# Patient Record
Sex: FEMALE | URBAN - METROPOLITAN AREA
[De-identification: names, ages, dates, MRNs, and addresses within clinical notes are randomized per-mention and may not be internally consistent; named-entity substitution may affect disease eponyms.]

---

## 2018-05-28 ENCOUNTER — OFFICE VISIT (OUTPATIENT)
Dept: URGENT CARE | Facility: CLINIC | Age: 58
End: 2018-05-28
Payer: COMMERCIAL

## 2018-05-28 VITALS
WEIGHT: 158 LBS | TEMPERATURE: 99 F | HEART RATE: 105 BPM | SYSTOLIC BLOOD PRESSURE: 134 MMHG | HEIGHT: 60 IN | RESPIRATION RATE: 16 BRPM | DIASTOLIC BLOOD PRESSURE: 87 MMHG | OXYGEN SATURATION: 96 % | BODY MASS INDEX: 31.02 KG/M2

## 2018-05-28 DIAGNOSIS — J06.9 VIRAL URI WITH COUGH: Primary | ICD-10-CM

## 2018-05-28 PROCEDURE — 99203 OFFICE O/P NEW LOW 30 MIN: CPT | Mod: 25,S$GLB,, | Performed by: NURSE PRACTITIONER

## 2018-05-28 PROCEDURE — 96372 THER/PROPH/DIAG INJ SC/IM: CPT | Mod: S$GLB,,, | Performed by: FAMILY MEDICINE

## 2018-05-28 RX ORDER — AMLODIPINE BESYLATE 10 MG/1
10 TABLET ORAL DAILY
COMMUNITY

## 2018-05-28 RX ORDER — AZITHROMYCIN 250 MG/1
TABLET, FILM COATED ORAL
Qty: 6 TABLET | Refills: 0 | Status: SHIPPED | OUTPATIENT
Start: 2018-05-28 | End: 2018-06-02

## 2018-05-28 RX ORDER — BETAMETHASONE SODIUM PHOSPHATE AND BETAMETHASONE ACETATE 3; 3 MG/ML; MG/ML
6 INJECTION, SUSPENSION INTRA-ARTICULAR; INTRALESIONAL; INTRAMUSCULAR; SOFT TISSUE
Status: COMPLETED | OUTPATIENT
Start: 2018-05-28 | End: 2018-05-28

## 2018-05-28 RX ORDER — BENZONATATE 100 MG/1
200 CAPSULE ORAL 3 TIMES DAILY PRN
Qty: 30 CAPSULE | Refills: 0 | Status: SHIPPED | OUTPATIENT
Start: 2018-05-28 | End: 2018-06-07

## 2018-05-28 RX ORDER — TRAZODONE HYDROCHLORIDE 100 MG/1
100 TABLET ORAL NIGHTLY
COMMUNITY

## 2018-05-28 RX ADMIN — BETAMETHASONE SODIUM PHOSPHATE AND BETAMETHASONE ACETATE 6 MG: 3; 3 INJECTION, SUSPENSION INTRA-ARTICULAR; INTRALESIONAL; INTRAMUSCULAR; SOFT TISSUE at 09:05

## 2018-05-28 NOTE — PATIENT INSTRUCTIONS
Please follow up with your primary care provider if you are not feeling better in 7-10 days.    Please drink plenty of fluids.  Please get plenty of rest.    Please return here or go to the Emergency Department for any concerns or worsening of condition.    If you do not have Hypertension or any history of palpitations, it is ok to take over the counter Sudafed or Mucinex D or Allegra-D or Claritin-D or Zyrtec-D.  If you do take one of the above, it is ok to combine that with plain over the counter Mucinex or Allegra or Claritin or Zyrtec.  If for example you are taking Zyrtec -D, you can combine that with Mucinex, but not Mucinex-D.  If you are taking Mucinex-D, you can combine that with plain Allegra or Claritin or Zyrtec.     If you do have Hypertension or palpitations, it is safe to take Coricidin HBP or Mucinex DM for relief of congestion and cough. Take as directed on bottle with at least 2 glasses of water.    If not allergic, please take over the counter Tylenol (Acetaminophen) and/or Motrin (Ibuprofen) as directed on bottle for control of pain and/or fever.    Please follow up with your primary care doctor or specialist as needed.    If you  smoke, please stop smoking.    Viral Upper Respiratory Illness (Adult)  You have a viral upper respiratory illness (URI), which is another term for the common cold. This illness is contagious during the first few days. It is spread through the air by coughing and sneezing. It may also be spread by direct contact (touching the sick person and then touching your own eyes, nose, or mouth). Frequent handwashing will decrease risk of spread. Most viral illnesses go away within 7 to 10 days with rest and simple home remedies. Sometimes the illness may last for several weeks. Antibiotics will not kill a virus, and they are generally not prescribed for this condition.    Home care  · If symptoms are severe, rest at home for the first 2 to 3 days. When you resume activity, don't  let yourself get too tired.  · Avoid being exposed to cigarette smoke (yours or others).  · You may use acetaminophen or ibuprofen to control pain and fever, unless another medicine was prescribed. (Note: If you have chronic liver or kidney disease, have ever had a stomach ulcer or gastrointestinal bleeding, or are taking blood-thinning medicines, talk with your healthcare provider before using these medicines.) Aspirin should never be given to anyone under 18 years of age who is ill with a viral infection or fever. It may cause severe liver or brain damage.  · Your appetite may be poor, so a light diet is fine. Avoid dehydration by drinking 6 to 8 glasses of fluids per day (water, soft drinks, juices, tea, or soup). Extra fluids will help loosen secretions in the nose and lungs.  · Over-the-counter cold medicines will not shorten the length of time youre sick, but they may be helpful for the following symptoms: cough, sore throat, and nasal and sinus congestion. (Note: Do not use decongestants if you have high blood pressure.)  Follow-up care  Follow up with your healthcare provider, or as advised.  When to seek medical advice  Call your healthcare provider right away if any of these occur:  · Cough with lots of colored sputum (mucus)  · Severe headache; face, neck, or ear pain  · Difficulty swallowing due to throat pain  · Fever of 100.4°F (38°C)  Call 911, or get immediate medical care  Call emergency services right away if any of these occur:  · Chest pain, shortness of breath, wheezing, or difficulty breathing  · Coughing up blood  · Inability to swallow due to throat pain  Date Last Reviewed: 9/13/2015 © 2000-2017 [employer] [address] [place] [id number]. All rights reserved. This information is not intended as a substitute for professional medical care. Always follow your healthcare professional's instructions.

## 2018-05-28 NOTE — PROGRESS NOTES
Subjective:       Patient ID: Khushbu Davis is a 57 y.o. female.    Vitals:  height is 5' (1.524 m) and weight is 71.7 kg (158 lb). Her temperature is 98.7 °F (37.1 °C). Her blood pressure is 134/87 and her pulse is 105. Her respiration is 16 and oxygen saturation is 96%.     Chief Complaint: Cough    Cough   This is a new problem. The current episode started in the past 7 days. The problem has been unchanged. The problem occurs constantly. The cough is productive of sputum. Associated symptoms include ear pain. Pertinent negatives include no chest pain, chills, eye redness, fever, headaches, myalgias, sore throat, shortness of breath or wheezing. She has tried OTC cough suppressant for the symptoms. The treatment provided mild relief. Her past medical history is significant for asthma.     Review of Systems   Constitution: Negative for chills, fever and malaise/fatigue.   HENT: Positive for congestion and ear pain. Negative for hoarse voice and sore throat.    Eyes: Negative for discharge and redness.   Cardiovascular: Negative for chest pain, dyspnea on exertion and leg swelling.   Respiratory: Positive for cough. Negative for shortness of breath, sputum production and wheezing.    Musculoskeletal: Negative for myalgias.   Gastrointestinal: Negative for abdominal pain and nausea.   Neurological: Negative for headaches.       Objective:      Physical Exam   Constitutional: She is oriented to person, place, and time. Vital signs are normal. She appears well-developed and well-nourished. She is cooperative.  Non-toxic appearance. She does not have a sickly appearance. She does not appear ill. No distress.   HENT:   Head: Normocephalic and atraumatic.   Right Ear: Hearing, tympanic membrane, external ear and ear canal normal.   Left Ear: Hearing, tympanic membrane, external ear and ear canal normal.   Nose: Mucosal edema and rhinorrhea present. Right sinus exhibits maxillary sinus tenderness. Left sinus exhibits  maxillary sinus tenderness.   Mouth/Throat: Uvula is midline and mucous membranes are normal. Posterior oropharyngeal edema and posterior oropharyngeal erythema present.   Eyes: Conjunctivae and lids are normal.   Neck: Normal range of motion and full passive range of motion without pain. Neck supple. No neck rigidity. No edema, no erythema and normal range of motion present.   Cardiovascular: Normal rate, regular rhythm and normal heart sounds.    Pulmonary/Chest: Effort normal and breath sounds normal. No accessory muscle usage. No apnea, no tachypnea and no bradypnea. No respiratory distress. She has no decreased breath sounds. She has no wheezes. She has no rhonchi. She has no rales.   Positive cough   Abdominal: Normal appearance and bowel sounds are normal.   Lymphadenopathy:        Head (right side): No submental, no submandibular and no tonsillar adenopathy present.        Head (left side): No submental, no submandibular and no tonsillar adenopathy present.     She has cervical adenopathy.        Right cervical: Superficial cervical adenopathy present.        Left cervical: Superficial cervical adenopathy present.   Neurological: She is alert and oriented to person, place, and time.   Skin: Skin is warm. Capillary refill takes less than 2 seconds.   Psychiatric: She has a normal mood and affect. Her speech is normal and behavior is normal.   Nursing note and vitals reviewed.      Assessment:       1. Viral URI with cough        Plan:         Viral URI with cough  -     betamethasone acetate-betamethasone sodium phosphate injection 6 mg; Inject 1 mL (6 mg total) into the muscle one time.  -     benzonatate (TESSALON PERLES) 100 MG capsule; Take 2 capsules (200 mg total) by mouth 3 (three) times daily as needed.  Dispense: 30 capsule; Refill: 0    Other orders  -     azithromycin (ZITHROMAX) 250 MG tablet; Take 2 tablets (500 mg) on  Day 1,  followed by 1 tablet (250 mg) once daily on Days 2 through 5.   Dispense: 6 tablet; Refill: 0      Discussed wait and see antibiotic and not to take unless needed in 5-7 days. Pt verbalizes understanding.

## 2024-03-15 ENCOUNTER — OFFICE VISIT (OUTPATIENT)
Dept: URGENT CARE | Facility: CLINIC | Age: 64
End: 2024-03-15
Payer: COMMERCIAL

## 2024-03-15 VITALS
WEIGHT: 158 LBS | RESPIRATION RATE: 18 BRPM | HEIGHT: 60 IN | BODY MASS INDEX: 31.02 KG/M2 | DIASTOLIC BLOOD PRESSURE: 99 MMHG | SYSTOLIC BLOOD PRESSURE: 159 MMHG | OXYGEN SATURATION: 95 % | TEMPERATURE: 98 F | HEART RATE: 81 BPM

## 2024-03-15 DIAGNOSIS — J32.9 SINUSITIS, UNSPECIFIED CHRONICITY, UNSPECIFIED LOCATION: Primary | ICD-10-CM

## 2024-03-15 LAB
CTP QC/QA: YES
CTP QC/QA: YES
POC MOLECULAR INFLUENZA A AGN: NEGATIVE
POC MOLECULAR INFLUENZA B AGN: NEGATIVE
SARS-COV-2 AG RESP QL IA.RAPID: NEGATIVE

## 2024-03-15 PROCEDURE — 87811 SARS-COV-2 COVID19 W/OPTIC: CPT | Mod: QW,S$GLB,, | Performed by: NURSE PRACTITIONER

## 2024-03-15 PROCEDURE — 99203 OFFICE O/P NEW LOW 30 MIN: CPT | Mod: S$GLB,,, | Performed by: NURSE PRACTITIONER

## 2024-03-15 PROCEDURE — 87502 INFLUENZA DNA AMP PROBE: CPT | Mod: QW,S$GLB,, | Performed by: NURSE PRACTITIONER

## 2024-03-15 RX ORDER — AZELASTINE 1 MG/ML
1 SPRAY, METERED NASAL 2 TIMES DAILY
Qty: 30 ML | Refills: 0 | Status: SHIPPED | OUTPATIENT
Start: 2024-03-15 | End: 2024-03-25

## 2024-03-15 RX ORDER — FLUTICASONE PROPIONATE 50 MCG
1 SPRAY, SUSPENSION (ML) NASAL 2 TIMES DAILY
Qty: 9.9 ML | Refills: 0 | Status: SHIPPED | OUTPATIENT
Start: 2024-03-15

## 2024-03-15 RX ORDER — PREDNISONE 20 MG/1
20 TABLET ORAL DAILY
Qty: 5 TABLET | Refills: 0 | Status: SHIPPED | OUTPATIENT
Start: 2024-03-15 | End: 2024-03-20

## 2024-03-15 NOTE — PROGRESS NOTES
Subjective:      Patient ID: Khushbu Davis is a 63 y.o. female.    Vitals:  height is 5' (1.524 m) and weight is 71.7 kg (158 lb). Her oral temperature is 98 °F (36.7 °C). Her blood pressure is 159/99 (abnormal) and her pulse is 81. Her respiration is 18 and oxygen saturation is 95%.     Chief Complaint: Sore Throat    63-year-old female presents to clinic for evaluation of nasal congestion, scratchy throat, headache, ear pressure x5 days.  She reports taking Benadryl and leave.  She does report already taking a daily antihistamine.  She denies any known exposures.  She expresses concerns given yesterday's symptoms of body aches and chills.  She is awake and alert, answers questions appropriately, no acute distress noted on today's visit.    Sore Throat   This is a new problem. The current episode started in the past 7 days. The problem has been unchanged. There has been no fever. The fever has been present for 5 days or more. The pain is at a severity of 0/10. The patient is experiencing no pain. Associated symptoms include congestion and headaches. She has tried NSAIDs for the symptoms. The treatment provided mild relief.       Constitution: Positive for chills. Negative for activity change, appetite change, sweating, fatigue and fever.   HENT:  Positive for congestion and sore throat.    Cardiovascular:  Negative for chest pain.   Gastrointestinal:  Positive for nausea.   Skin:  Negative for erythema.   Neurological:  Positive for headaches. Negative for dizziness.      Objective:     Physical Exam   Constitutional: She is oriented to person, place, and time. She appears well-developed.  Non-toxic appearance. She does not appear ill.   HENT:   Head: Normocephalic and atraumatic. Head is without abrasion, without contusion and without laceration.   Ears:   Right Ear: Tympanic membrane and external ear normal.   Left Ear: Tympanic membrane and external ear normal.   Nose: Mucosal edema and congestion present.  Right sinus exhibits maxillary sinus tenderness. Left sinus exhibits maxillary sinus tenderness.   Mouth/Throat: Mucous membranes are normal. Mucous membranes are moist. Posterior oropharyngeal erythema present. No oropharyngeal exudate. Oropharynx is clear.   Eyes: Conjunctivae, EOM and lids are normal. Pupils are equal, round, and reactive to light.   Neck: Trachea normal and phonation normal. Neck supple.   Cardiovascular: Normal rate, regular rhythm and normal heart sounds.   Pulmonary/Chest: Effort normal and breath sounds normal. No stridor. No respiratory distress.   Abdominal: Normal appearance.   Musculoskeletal: Normal range of motion.         General: Normal range of motion.   Neurological: She is alert and oriented to person, place, and time.   Skin: Skin is warm, dry, intact and no rash. Capillary refill takes less than 2 seconds. No abrasion, No burn, No bruising, No erythema and No ecchymosis   Psychiatric: Her speech is normal and behavior is normal. Judgment and thought content normal.   Nursing note and vitals reviewed.    Results for orders placed or performed in visit on 03/15/24   POCT Influenza A/B MOLECULAR   Result Value Ref Range    POC Molecular Influenza A Ag Negative Negative, Not Reported    POC Molecular Influenza B Ag Negative Negative, Not Reported     Acceptable Yes    SARS Coronavirus 2 Antigen, POCT Manual Read   Result Value Ref Range    SARS Coronavirus 2 Antigen Negative Negative     Acceptable Yes          Assessment:     1. Sinusitis, unspecified chronicity, unspecified location        Plan:       Sinusitis, unspecified chronicity, unspecified location  -     POCT Influenza A/B MOLECULAR  -     SARS Coronavirus 2 Antigen, POCT Manual Read  -     fluticasone propionate (FLONASE) 50 mcg/actuation nasal spray; 1 spray (50 mcg total) by Each Nostril route 2 (two) times daily.  Dispense: 9.9 mL; Refill: 0  -     azelastine (ASTELIN) 137 mcg (0.1 %)  nasal spray; 1 spray (137 mcg total) by Nasal route 2 (two) times daily. for 10 days  Dispense: 30 mL; Refill: 0  -     predniSONE (DELTASONE) 20 MG tablet; Take 1 tablet (20 mg total) by mouth once daily. for 5 days  Dispense: 5 tablet; Refill: 0      - negative COVID, negative influenza on today's visit.  Will treat for viral etiology.  Follow-up with PCP.  Patient verbalized understanding and is in agreement with the plan.    Patient Instructions   - Follow up with your PCP or specialty clinic as directed in the next 1-2 weeks if not improved or as needed.  You can call [phone] to schedule an appointment with the appropriate provider.    - Go to the ER or seek medical attention immediately if you develop new or worsening symptoms.    - You must understand that you have received an Urgent Care treatment only and that you may be released before all of your medical problems are known or treated.   - You, the patient, will arrange for follow up care as instructed.   - If your condition worsens or fails to improve we recommend that you receive another evaluation at the ER immediately or contact your PCP to discuss your concerns or return here.          - Tylenol or Ibuprofen as directed as needed for fever/pain. Avoid tylenol if you have a history of liver disease. Do not take ibuprofen if you have a history of GI bleeding, kidney disease, or if you take blood thinners.   - Take ibuprofen 600-800 mg every 6-8 hours for pain and inflammation.  You can also take Tylenol/acetaminophen 650-1000 mg every 6-8 hours for added pain relief.     - You can take over-the-counter claritin, zyrtec, allegra, or xyzal as directed. These are antihistamines that can help with runny nose, nasal congestion, sneezing, and helps to dry up post-nasal drip, which usually causes sore throat and cough.              - If you do NOT have high blood pressure, you may use a decongestant form (D)  of this medication or if you do not take the  D form, you can take sudafed (pseudoephedrine) over the counter, which is a decongestant.     - You can use Flonase (fluticasone) nasal spray as directed for sinus congestion and postnasal drip. This is a steroid nasal spray that works locally over time to decrease the inflammation in your nose/sinuses and help with allergic symptoms. This is not an quick- relief spray like afrin, but it works well if used daily.  Discontinue if you develop nose bleed  - use nasal saline prior to Flonase.     - Use Ocean Spray Nasal Saline 1-3 puffs each nostril every 2-3 hours then blow out onto tissue. This is to irrigate the nasal passage way to clear the sinus openings. Use until sinus problem resolved.     - you can take plain Mucinex (guaifenesin) 1200 mg twice a day to help loosen mucous     -warm salt water gargles can help with sore throat     - warm tea with honey can help with cough. Honey is a natural cough suppressant.     - Follow up with your PCP or specialty clinic as directed in the next 1-2 weeks if not improved or as needed.  You can call [phone] to schedule an appointment with the appropriate provider.       - Go to the ER if you develop new or worsening symptoms.

## 2024-03-15 NOTE — PATIENT INSTRUCTIONS
- Follow up with your PCP or specialty clinic as directed in the next 1-2 weeks if not improved or as needed.  You can call [phone] to schedule an appointment with the appropriate provider.    - Go to the ER or seek medical attention immediately if you develop new or worsening symptoms.    - You must understand that you have received an Urgent Care treatment only and that you may be released before all of your medical problems are known or treated.   - You, the patient, will arrange for follow up care as instructed.   - If your condition worsens or fails to improve we recommend that you receive another evaluation at the ER immediately or contact your PCP to discuss your concerns or return here.          - Tylenol or Ibuprofen as directed as needed for fever/pain. Avoid tylenol if you have a history of liver disease. Do not take ibuprofen if you have a history of GI bleeding, kidney disease, or if you take blood thinners.   - Take ibuprofen 600-800 mg every 6-8 hours for pain and inflammation.  You can also take Tylenol/acetaminophen 650-1000 mg every 6-8 hours for added pain relief.     - You can take over-the-counter claritin, zyrtec, allegra, or xyzal as directed. These are antihistamines that can help with runny nose, nasal congestion, sneezing, and helps to dry up post-nasal drip, which usually causes sore throat and cough.              - If you do NOT have high blood pressure, you may use a decongestant form (D)  of this medication or if you do not take the D form, you can take sudafed (pseudoephedrine) over the counter, which is a decongestant.     - You can use Flonase (fluticasone) nasal spray as directed for sinus congestion and postnasal drip. This is a steroid nasal spray that works locally over time to decrease the inflammation in your nose/sinuses and help with allergic symptoms. This is not an quick- relief spray like afrin, but it works well if used daily.  Discontinue if you develop nose  bleed  - use nasal saline prior to Flonase.     - Use Ocean Spray Nasal Saline 1-3 puffs each nostril every 2-3 hours then blow out onto tissue. This is to irrigate the nasal passage way to clear the sinus openings. Use until sinus problem resolved.     - you can take plain Mucinex (guaifenesin) 1200 mg twice a day to help loosen mucous     -warm salt water gargles can help with sore throat     - warm tea with honey can help with cough. Honey is a natural cough suppressant.     - Follow up with your PCP or specialty clinic as directed in the next 1-2 weeks if not improved or as needed.  You can call [phone] to schedule an appointment with the appropriate provider.       - Go to the ER if you develop new or worsening symptoms.

## 2024-08-08 ENCOUNTER — TELEPHONE (OUTPATIENT)
Dept: VASCULAR SURGERY | Facility: CLINIC | Age: 64
End: 2024-08-08
Payer: COMMERCIAL

## 2024-08-08 DIAGNOSIS — M79.89 LEG SWELLING: Primary | ICD-10-CM

## 2024-08-28 ENCOUNTER — HOSPITAL ENCOUNTER (OUTPATIENT)
Dept: RADIOLOGY | Facility: HOSPITAL | Age: 64
Discharge: HOME | End: 2024-08-28
Attending: SURGERY
Payer: COMMERCIAL

## 2024-08-28 DIAGNOSIS — M79.89 LEG SWELLING: ICD-10-CM

## 2024-08-28 PROCEDURE — 93970 EXTREMITY STUDY: CPT | Mod: 26,,, | Performed by: STUDENT IN AN ORGANIZED HEALTH CARE EDUCATION/TRAINING PROGRAM

## 2024-08-28 PROCEDURE — 93970 EXTREMITY STUDY: CPT | Mod: TC

## 2024-08-29 ENCOUNTER — INITIAL CONSULT (OUTPATIENT)
Dept: VASCULAR SURGERY | Facility: CLINIC | Age: 64
End: 2024-08-29
Payer: COMMERCIAL

## 2024-08-29 ENCOUNTER — TELEPHONE (OUTPATIENT)
Dept: VASCULAR SURGERY | Facility: CLINIC | Age: 64
End: 2024-08-29

## 2024-08-29 VITALS
HEIGHT: 61 IN | BODY MASS INDEX: 33.61 KG/M2 | WEIGHT: 178 LBS | HEART RATE: 94 BPM | DIASTOLIC BLOOD PRESSURE: 114 MMHG | SYSTOLIC BLOOD PRESSURE: 182 MMHG

## 2024-08-29 DIAGNOSIS — I89.0 LYMPHEDEMA: Primary | ICD-10-CM

## 2024-08-29 DIAGNOSIS — M79.89 LEG SWELLING: ICD-10-CM

## 2024-08-29 DIAGNOSIS — R60.9 LIPEDEMA: ICD-10-CM

## 2024-08-29 PROCEDURE — 99999 PR PBB SHADOW E&M-EST. PATIENT-LVL III: CPT | Mod: PBBFAC,,, | Performed by: SURGERY

## 2024-08-29 PROCEDURE — 99204 OFFICE O/P NEW MOD 45 MIN: CPT | Mod: S$GLB,,, | Performed by: SURGERY

## 2024-08-29 NOTE — PROGRESS NOTES
Hoang Roper MD, RPVI                                 [facility]                           [facility]                             2024    HPI:  Khushbu Davis is a 64 y.o. female with There is no problem list on file for this patient.   being managed by PCP and specialists who is here today for evaluation of BLE edema.  Patient states location is BLE occurring for yrs.  Associated signs and symptoms include pain.  Quality is heavy and severity is 6/10.  Symptoms began yrs ago.  Alleviating factors include elevation.  Worsening factors include dependency.  Patient has not been wearing compression stockings for greater than 3 months.  Symptoms do limit patient's functional status and daily activities.  no DVT history.  no venous interventions.  no low sodium diet.  no excessive water intake.    Migraine with aura: no  PFO/ASD/right to left shunt: no  Pregnant: no  Breastfeeding: no    no MI  no Stroke  Tobacco use: denies    Past Medical History:   Diagnosis Date    Anxiety     Depression     Hypertension      Past Surgical History:   Procedure Laterality Date    BELT ABDOMINOPLASTY       SECTION      CHOLECYSTECTOMY       No family history on file.  Social History     Socioeconomic History    Marital status:    Tobacco Use    Smoking status: Never     Social Determinants of Health     Financial Resource Strain: Low Risk  (2024)    Overall Financial Resource Strain (CARDIA)     Difficulty of Paying Living Expenses: Not hard at all   Food Insecurity: No Food Insecurity (2024)    Hunger Vital Sign     Worried About Running Out of Food in the Last Year: Never true     Ran Out of Food in the Last Year: Never true   Transportation Needs: No Transportation Needs (2024)    [facility]     Lack of Transportation (Medical): No     Lack of Transportation (Non-Medical): No   Physical Activity: Sufficiently Active  (8/22/2024)    Exercise Vital Sign     Days of Exercise per Week: 5 days     Minutes of Exercise per Session: 30 min   Stress: No Stress Concern Present (8/22/2024)    [place] citizen [place] [employer] - Occupational Stress Questionnaire     Feeling of Stress : Only a little   Housing Stability: Unknown (8/22/2024)    Housing Stability Vital Sign     Unable to Pay for Housing in the Last Year: No       Current Outpatient Medications:     fluticasone propionate (FLONASE) 50 mcg/actuation nasal spray, 1 spray (50 mcg total) by Each Nostril route 2 (two) times daily., Disp: 9.9 mL, Rfl: 0    traZODone (DESYREL) 100 MG tablet, Take 100 mg by mouth every evening., Disp: , Rfl:     UNKNOWN TO PATIENT, , Disp: , Rfl:     amLODIPine (NORVASC) 10 MG tablet, Take 10 mg by mouth once daily., Disp: , Rfl:     azelastine (ASTELIN) 137 mcg (0.1 %) nasal spray, 1 spray (137 mcg total) by Nasal route 2 (two) times daily. for 10 days, Disp: 30 mL, Rfl: 0    REVIEW OF SYSTEMS:  General: No fevers or chills; ENT: No sore throat; Allergy and Immunology: no persistent infections; Hematological and Lymphatic: No history of bleeding or easy bruising; Endocrine: negative; Respiratory: no cough, shortness of breath, or wheezing; Cardiovascular: no chest pain or dyspnea on exertion; Gastrointestinal: no abdominal pain/back, change in bowel habits, or bloody stools; Genito-Urinary: no dysuria, trouble voiding, or hematuria; Musculoskeletal: edema; Neurological: no TIA or stroke symptoms; Psychiatric: no nervousness, anxiety or depression.    PHYSICAL EXAM:      Pulse: 94         General appearance:  Alert, well-appearing, and in no distress.  Oriented to person, place, and time                    Neurological: Normal speech, no focal findings noted; CN II - XII grossly intact. RLE with sensation to light touch, LLE with sensation to light touch.            Musculoskeletal: Digits/nail without cyanosis/clubbing.  Strength 5/5 BLE.        "             Neck: Supple, no significant adenopathy                  Chest:  No wheezes, symmetric air entry. No use of accessory muscles               Cardiac: Normal rate and regular rhythm            Abdomen: Soft, nontender, nondistended      Extremities:   2+ R DP pulse, 2+ L DP pulse      1+ RLE edema, 1+ LLE edema    Skin:  RLE no ulcer; LLE no ulcer      RLE no spider veins, LLE no spider veins      RLE no varicose veins, LLE no varicose veins    CEAP 3/3    VCSS 6    LAB RESULTS:  No results found for: "CBC"  No results found for: "LABPROT", "INR"  Lab Results   Component Value Date     (L) 03/15/2022    K 3.8 03/15/2022    CO2 24 03/15/2022    BUN 18.7 (H) 03/15/2022    CREATININE 0.71 03/15/2022    CALCIUM 9.6 03/15/2022    ANIONGAP 4 (L) 03/15/2022     No results found for: "WBC", "RBC", "HGB", "HCT", "MCV", "MCH", "MCHC", "RDW", "PLT", "MPV", "GRAN", "LYMPH", "MONO", "EOS", "BASO", "EOSINOPHIL", "BASOPHIL", "DIFFMETHOD"  .No results found for: "HGBA1C"    IMAGING:  All pertinent imaging has been reviewed and interpreted independently.    Venous US 8/2024 Impression:  Color flow evaluation of the right lower extremity demonstrates no evidence of venous thrombosis in the deep or superficial veins, and no reflux.  Color flow evaluation of the left lower extremity demonstrates no evidence of venous thrombosis in the deep or superficial veins, and no reflux.    IMP/PLAN:  64 y.o. female with There is no problem list on file for this patient.   being managed by PCP and specialists who is here today for evaluation of BLE edema and lymphedema.    -recommend compression with Rx stockings, elevation, dietary changes associated with water and sodium intake discussed at length with patient  -Exercise   -Patient is diagnosed with lymphedema and presents with hyperpigmentation of the lower extremity.  Patient has previously attempted compression, elevation, and exercise for at least 4 weeks.  Patient has " secondary lymphedema and has tried and failed compression of 20-30 mm Hg, elevation and exercise for >1 month period however symptoms persist.  Basic pump trial performed and discontinued due to sensitivity and pain to static pressure.  Symptoms of swelling, pain and hyperplasia present.  A compression device is recommended to treat current symptoms and prevent disease progression. The patient has tried elevation, exercise x 4 weeks and compression and symptoms remain.  The patient has marked hyperkeratosis with hyperplasia and hyperpigmentation. Pt has been compliant with diet/med eval, MLD, skin care  - recommend lymphedema clinic therapy and pumps  -RTC 3 months for further evaluation    I spent 12 minutes evaluating this patient and greater than 50% of the time was spent counseling, coordinator care and discussing the plan of care.  All questions were answered and patient stated understanding with agreement with the above treatment plan.    Hoang Roper MD [facility]  Vascular and Endovascular Surgery

## 2024-09-04 DIAGNOSIS — I89.0 LYMPHEDEMA: ICD-10-CM

## 2024-09-04 DIAGNOSIS — M79.89 LEG SWELLING: Primary | ICD-10-CM

## 2024-09-17 DIAGNOSIS — I89.0 LYMPHEDEMA: Primary | ICD-10-CM

## 2024-09-17 DIAGNOSIS — M79.89 LEG SWELLING: ICD-10-CM

## 2024-09-27 ENCOUNTER — TELEPHONE (OUTPATIENT)
Dept: VASCULAR SURGERY | Facility: CLINIC | Age: 64
End: 2024-09-27
Payer: COMMERCIAL

## 2024-09-27 NOTE — TELEPHONE ENCOUNTER
Pneumatic compression pump prescription faxed to [facility]. Confirmation of fax received via email.

## 2024-09-30 ENCOUNTER — CLINICAL SUPPORT (OUTPATIENT)
Dept: REHABILITATION | Facility: HOSPITAL | Age: 64
End: 2024-09-30
Payer: COMMERCIAL

## 2024-09-30 DIAGNOSIS — M79.89 LEG SWELLING: ICD-10-CM

## 2024-09-30 DIAGNOSIS — I89.0 LYMPHEDEMA OF BOTH LOWER EXTREMITIES: Primary | ICD-10-CM

## 2024-09-30 DIAGNOSIS — M79.89 SWELLING OF LOWER EXTREMITY: ICD-10-CM

## 2024-09-30 DIAGNOSIS — I89.0 LYMPHEDEMA: ICD-10-CM

## 2024-09-30 PROCEDURE — 97162 PT EVAL MOD COMPLEX 30 MIN: CPT

## 2024-09-30 PROCEDURE — 97140 MANUAL THERAPY 1/> REGIONS: CPT

## 2024-09-30 PROCEDURE — 97535 SELF CARE MNGMENT TRAINING: CPT

## 2024-10-02 NOTE — PATIENT INSTRUCTIONS
Attendance policy:     Consistent attendance is critical to the success of the rehabilitation plan that you and your therapist have established.     If you have any concerns with your schedule, please contact the  at least 24 hours prior to your scheduled appointment. If you are more than 15 minutes late to your appointment, you may be asked to reschedule.     If you no show 2 appointments or miss 3 visits consecutively, you will be removed from the schedule or discharged from therapy. Appointments will then be scheduled on a [id number] basis.       [facility] 2nd Floor   Direct Phone: [phone]

## 2024-10-11 ENCOUNTER — CLINICAL SUPPORT (OUTPATIENT)
Dept: REHABILITATION | Facility: HOSPITAL | Age: 64
End: 2024-10-11
Payer: COMMERCIAL

## 2024-10-11 DIAGNOSIS — I89.0 LYMPHEDEMA OF BOTH LOWER EXTREMITIES: Primary | ICD-10-CM

## 2024-10-11 DIAGNOSIS — M79.89 SWELLING OF LOWER EXTREMITY: ICD-10-CM

## 2024-10-11 PROCEDURE — 97016 VASOPNEUMATIC DEVICE THERAPY: CPT

## 2024-10-11 PROCEDURE — 97140 MANUAL THERAPY 1/> REGIONS: CPT

## 2024-10-11 PROCEDURE — 97535 SELF CARE MNGMENT TRAINING: CPT

## 2024-10-11 NOTE — PROGRESS NOTES
Physical Therapy Daily Treatment Note     Name: Khushbu Davis  Clinic Number: [id number]    Therapy Diagnosis:   Encounter Diagnoses   Name Primary?    Lymphedema of both lower extremities Yes    Swelling of lower extremity      Physician: Hoang Roper MD    Visit Date: 10/11/2024    Physician Orders: PT Eval and Treat - lymphedema  Medical Diagnosis from Referral:   Diagnosis   I89.0 (ICD-10-CM) - Lymphedema, not elsewhere classified   M79.89 (ICD-10-CM) - Other specified soft tissue disorders      Free Text Diagnosis   I89.0 (ICD-10-CM) - Lymphedema M79.89 (ICD-10-CM) - Leg swelling   Evaluation Date: 9/30/2024  Authorization Period Expiration: 12/31/24  Plan of Care Expiration: 12/24/24  Visit # / Visits authorized: 2/20     Time In: 1250p  Time Out: 150p  Total Billable Time: 60 minutes  pump     Precautions: Standard    Subjective     Pt reports: she was told by Dr. Roper of home pump consult - no contact yet.   She was compliant with home compression/exercise program.  Response to previous treatment: pt is wearing her Sigvaris TH garments daily, she will consider other options for compression choices with therapy guidance.   Functional change: amb no device, pt will be in [place] next week - rescheduled visit.   Pt has other appointments today - wishes to have 1 leg bandaged - questions fit of shoe.     Pain: 2/10  Location: bilateral BLE mostly tender to touch, admits more pain in L UE - may seek orders for arm sleeve as mentioned with her doctor      Objective   Female amb to dept no device, wearing Sigvaris TH 20-30mmHg B LE, sandals  Amount of Swelling/Location of Swelling: mild/mod soft fullness to B LE ankles, lower legs, triangular soft fullness thighs   Skin Integrity: fatty distributions prox leg, ankles, thighs   Palpation/Texture: soft fullness, density, no pitting, tender to deep touch, broken veins   - B Stemmer Sign  - B Neo's Sign  Circulation: intact       Treatment:   Khushbu received  the following manual therapy techniques:- Manual Lymphatic Drainage were applied to the: B LE for 45 minutes, including: MLD and short stretch compression bandaging   Reapplied TH garment to one  LE    MANUAL LYMPHATIC DRAINAGE (MLD):    While supine with LEs elevated stimulation at terminus, along GI region, B inguinal regions, drainage of entire L LE fred lower leg, ankle, and foot with return proximally,  Use of Aquaphor/Eucerin due to dryness.   Consider self massage to abdominal areas, B inguinal areas, thigh, and remaining LE within reach.    SEQUENTIAL COMPRESSION PUMP: full leg sleeve applied to one LE  while  simultaneous to Manual Lymphatic Drainage, MLD,  to  other  LE  VES 5200 with default setting with distal pressures starting at 45mmHg entire LE   Simultaneous to compression supplies, compression education and training, and self management.    MULTILAYERED BANDAGING: issued supplies and bandaged L LE with cotton stockinette, cotton padding, 2-8cm Rosidal K rolls foot to knee, Edema Wear Large to thigh to leave intact 12-24 hrs as tolerated, discontinue with any problems, return rolled bandages next session. Wash and wear schedules confirmed.   Reapplied TH to R LE.  Slight push on bandaging with her sandal choice - advised to monitor and readjust foot once home as allowed.     Khushbu received therapeutic exercises to develop strength, endurance, ROM, flexibility, and posture  including: aps, walking, avoid dependency and immobility, support of muscle pump with compression and activity.  THERAPEUTIC EXERCISES:  Continue HEP of AROM, stretching, and postural correction.     Home Exercises Provided and Patient Education Provided:  Self Care Home Management Training/Functional Therapeutic Activity x 15 minutes  Discussed options with Lipo Lymphedema vs Lymphedema vs Lipedema- including product selection, treatment components, and consults pending.  Reviewed travel demands and recommendations for  "compression, hydration and mobility.   Compression, mobility and goals of management regarding lipo lymphedema and self massage for B LE as well as upper body and L UE.   Compression choices in style, class and product - past visit given suggestions for Bioflect style support.   Present compression:  Sigvaris TH 20-30mmHg x 3  Orders and recommendations: daily compression,  TH, KH + tights, leggings, Dr. Roper sent pump consult to [facility],  Therapy forwarded supporting documentation and measurements 10/2/24.  PATIENT/FAMILY Education: bandaging/compression wear schedule,  HEP,  Beginning of self massage,  Self or assisted bandaging, compression options, and Risk reduction    Written Home Exercises Provided: Patient instructed to cont prior HEP.  Home exercise and compression plan of management was reviewed and Khushbu was able to demonstrate understanding prior to the end of the session.  Khushbu demonstrated good  understanding of the education provided.     Assessment   Khushbu DONATO is a 64 y.o. female referred to outpatient Physical Therapy with a medical diagnosis of   Diagnosis   I89.0 (ICD-10-CM) - Lymphedema, not elsewhere classified   M79.89 (ICD-10-CM) - Other specified soft tissue disorders      Free Text Diagnosis   I89.0 (ICD-10-CM) - Lymphedema M79.89 (ICD-10-CM) - Leg swelling   This patient presents s/p LE pain and edema with lipedema characteristics resulting in: mild lymphedema of the B LE, increased pain, increased stiffness in the soft tissues and tenderness, size/shape changes, bruising, broken veins, as well as difficulty performing weight loss/management , compression needs, and placing the pt at higher risk of infection. Will initiate Complete Decongestive Therapy, CDT, to assist in symptom relief and management.     Pt with complaints of "lumpy fatty tissue and tenderness to touch" in both legs and left arm.  Pt is using LE compression TH daily.  Therapy initiated Complete Decongestive Therapy, " CDT, to  BLE to aid in symptomatic complaints.  Reviewed lipo  lymphedema vs lipedema characteristics and management plans to support.  Attempting bandaging and will consider new or alternate compression choices if beneficial. MD referral for therapy, home pump consult and already has 3 pairs TH.    Khushbu Is progressing well towards her goals.   Pt prognosis is Good.     Pt will continue to benefit from skilled outpatient physical therapy to address the deficits listed in the problem list box on initial evaluation, provide pt/family education and to maximize pt's level of independence in the home and community environment.     Pt's spiritual, cultural and educational needs considered and pt agreeable to plan of care and goals.    Anticipated Barriers for therapy: cost of copay, shoulder/neck PT needs     The following goals were discussed with the patient and patient is in agreement with them as to be addressed in the treatment plan.      Short Term Goals: (6 weeks)  1. Patient will show decreased girth in B LE by up to 1 cm to allow for LE symmetry, shoe and clothing choice, and ability to apply needed compression.  (progressing, not met)   2. Patient will demonstrate 100% knowledge of lymphedema precautions and signs of infection to allow for reduced lymphedema risk, infection risk, and/or exacerbation of condition.  (progressing, not met)  3. Patient or caregiver will perform self-bandaging techniques and/or wearing of compression garments to allow for lymphatic drainage support, skin elasticity, and reduction in shape and size of limb. (progressing, not met)  4. Patient will perform self lymph drainage techniques to areas within reach to enhance lymphatic drainage and skin condition.  (progressing, not met)  5. Patient will tolerate daily activities with multilayered bandaging to allow for lymphatic and venous support.  (progressing, not met)     Long Term Goals: (12  weeks)  1. Patient will show decreased girth  in B LE by up to 1 cm  to allow for LE symmetry, shoe and clothing choice, and ability to apply needed compression daily.  (progressing, not met)  2. Patient will show reduction in density to mild or less with improved contour of limb to allow for cosmesis, LE symmetry, infection risk reduction, and clothing and compression choice.   (progressing, not met)  3. Patient to ashley/doff compression garment with daily compliance to assist in lymphedema management, skin elasticity, and tissue density.  (progressing, not met)  4. Pt to show improved postural awareness and alignment.  (progressing, not met)  5. Pt to be I and compliant with HEP to allow for increased function in affected limb.   (progressing, not met)  Plan   Plan of care Certification: 9/30/2024 to 12/24/24.     Outpatient Physical Therapy 1 times weekly for 10 weeks to include the following interventions: Patient Education, Self Care, Therapeutic Activities, and Therapeutic Exercise. Complete Decongestive Therapy- compression and home equipment needs to be addressed and assisted.    Essence Cobos, PT

## 2024-10-25 ENCOUNTER — CLINICAL SUPPORT (OUTPATIENT)
Dept: REHABILITATION | Facility: HOSPITAL | Age: 64
End: 2024-10-25
Payer: COMMERCIAL

## 2024-10-25 DIAGNOSIS — M79.89 SWELLING OF LOWER EXTREMITY: ICD-10-CM

## 2024-10-25 DIAGNOSIS — I89.0 LYMPHEDEMA OF BOTH LOWER EXTREMITIES: Primary | ICD-10-CM

## 2024-10-25 PROCEDURE — 97140 MANUAL THERAPY 1/> REGIONS: CPT

## 2024-10-25 PROCEDURE — 97016 VASOPNEUMATIC DEVICE THERAPY: CPT

## 2024-10-25 NOTE — PROGRESS NOTES
"  Physical Therapy Daily Treatment Note     Name: Khushbu Davis  Clinic Number: [id number]    Therapy Diagnosis:   Encounter Diagnoses   Name Primary?    Lymphedema of both lower extremities Yes    Swelling of lower extremity      Physician: Hoang Roper MD    Visit Date: 10/25/2024    Physician Orders: PT Eval and Treat - lymphedema  Medical Diagnosis from Referral:   Diagnosis   I89.0 (ICD-10-CM) - Lymphedema, not elsewhere classified   M79.89 (ICD-10-CM) - Other specified soft tissue disorders      Free Text Diagnosis   I89.0 (ICD-10-CM) - Lymphedema M79.89 (ICD-10-CM) - Leg swelling   Evaluation Date: 9/30/2024  Authorization Period Expiration: 12/31/24  Plan of Care Expiration: 12/24/24  Visit # / Visits authorized: 3/20     Time In: 100p  Time Out: 200p  Total Billable Time: 60 minutes  pump     Precautions: Standard    Subjective     Pt reports: pt visited her son in [place] [facility], while there she saw a Lipedema specialist who treats with liposuction - 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  Pt will continue with present treatments and seek referral for arms, she may consider return to [place] [facility] specialist next year.   She was compliant with home compression/exercise program.  Response to previous treatment: Contact from pump vendor,  [employer] - states $800 per deductible, may work with for lessor output - awaiting further information since return from travel.  Pt continues to have most pain complaints in L arm.  Legs and arms may be tender especially to touch.   She has Sigvaris TH garments daily, hasn't had time to consider other options for compression choices.   Functional change: after much walking in [place] [facility] had soreness, she went for a muscle massage - tender but "hurts to relief"   Pt will need to question Dr. Roper for arm treatments - will complete LE for several visits as trial.   Wearing TH daily.   Pain: 2/10  Location: bilateral BLE mostly " tender to touch, admits more pain in L UE - may seek orders for arm sleeve as mentioned with her doctor      Objective   Female amb to dept no device, wearing Sigvaris TH 20-30mmHg B LE, sandals  Amount of Swelling/Location of Swelling: mild/mod soft fullness to B LE ankles, lower legs, triangular soft fullness thighs   Skin Integrity: fatty distributions prox leg, ankles, thighs   Palpation/Texture: soft fullness, density, no pitting, tender to deep touch, broken veins   - B Stemmer Sign  - B Neo's Sign  Circulation: intact       Treatment:   Khushbu received the following manual therapy techniques:- Manual Lymphatic Drainage were applied to the: B LE for 55 minutes, including: MLD and short stretch compression bandaging   Reapplied TH garment to one  LE    MANUAL LYMPHATIC DRAINAGE (MLD):    While supine with LEs elevated stimulation at terminus, along GI region, B inguinal regions, drainage of entire R LE fred lower leg, ankle, and foot with return proximally,  Use of Aquaphor/Eucerin due to dryness.   Consider self massage to abdominal areas, B inguinal areas, thigh, and remaining LE within reach.    SEQUENTIAL COMPRESSION PUMP: full leg sleeve applied to one LE  while  simultaneous to Manual Lymphatic Drainage, MLD,  to  other  LE  VES 5200 with default setting with distal pressures starting at 45mmHg entire LE   Simultaneous to compression supplies, compression education and training, and self management.    MULTILAYERED BANDAGING: issued supplies and bandaged R and L  LE with cotton stockinette, cotton padding, 1 8cm and 1-10cm Rosidal K rolls foot to knee, Edema Wear Large to thigh to leave intact 12-24 hrs as tolerated, discontinue with any problems, return rolled bandages next session. Wash and wear schedules confirmed.   Very slight push on bandaging with her sandal choice - advised to monitor and readjust foot once home as allowed.     Khushbu received therapeutic exercises to develop strength, endurance,  ROM, flexibility, and posture  including: aps, walking, avoid dependency and immobility, support of muscle pump with compression and activity.  THERAPEUTIC EXERCISES:  Continue HEP of AROM, stretching, and postural correction.     Home Exercises Provided and Patient Education Provided:  Self Care Home Management Training/Functional Therapeutic Activity x 5 minutes  UE vs LE  Present orders per Dr. Roper for leg swelling - will continue 4-5 visits and request consideration for UE as warranted  discussed options with Lipo Lymphedema vs Lymphedema vs Lipedema- including product selection, treatment components, and consults pending.  Compression, mobility and goals of management regarding lipo lymphedema and self massage for B LE as well as upper body and L UE.   Compression choices in style, class and product - past visit given suggestions for Bioflect style support.   Pump consult status:  States deductible with $400-$800 her responsibility - reviewed pump use and goals of treatment - B LE leg sleeves and pump unit per her insurance and vendor consulted by Dr. Roper - often home trial and training per vendor.   Orders B  LE  leg swelling, Lymphedema, will need orders/referral to treat UE.  Present compression:  Sigvaris TH 20-30mmHg x 3  Options for Bioflect or Solidea - leggings tights  Orders and recommendations: daily compression,  TH, KH + tights, leggings, Dr. Roper sent pump consult to [employer],  Therapy forwarded supporting documentation 10/2/24.  PATIENT/FAMILY Education: bandaging/compression wear schedule,  HEP,  Beginning of self massage,  Self or assisted bandaging, compression options, and Risk reduction    Written Home Exercises Provided: Patient instructed to cont prior HEP.  Home exercise and compression plan of management was reviewed and Khushbu was able to demonstrate understanding prior to the end of the session.  Khushbu demonstrated good  understanding of the education provided.      Assessment   Khushbu DONATO is a 64 y.o. female referred to outpatient Physical Therapy with a medical diagnosis of   Diagnosis   I89.0 (ICD-10-CM) - Lymphedema, not elsewhere classified   M79.89 (ICD-10-CM) - Other specified soft tissue disorders      Free Text Diagnosis   I89.0 (ICD-10-CM) - Lymphedema M79.89 (ICD-10-CM) - Leg swelling   This patient presents s/p LE pain and edema with lipedema characteristics resulting in: mild lymphedema of the B LE, increased pain, increased stiffness in the soft tissues and tenderness, size/shape changes, bruising, broken veins, as well as difficulty performing weight loss/management , compression needs, and placing the pt at higher risk of infection. Will initiate Complete Decongestive Therapy, CDT, to assist in symptom relief and management.     Pt admits outside consult noting components of lipedema B UE and B LE with lymphedema present in lower  legs.  Support with compression, pump assist and exercise/activity can assist these conditions.  Pt has TH garments and will continue daily support.  Remains in process with home pump consult per Dr. Roper for her B LE.  B UE will need added referral and focus may shift once B LE have secured management plans.   Lipo  lymphedema vs lipedema characteristics and management plans.    Khushbu Is progressing well towards her goals.   Pt prognosis is Good.     Pt will continue to benefit from skilled outpatient physical therapy to address the deficits listed in the problem list box on initial evaluation, provide pt/family education and to maximize pt's level of independence in the home and community environment.     Pt's spiritual, cultural and educational needs considered and pt agreeable to plan of care and goals.    Anticipated Barriers for therapy: cost of copay, shoulder/neck PT needs     The following goals were discussed with the patient and patient is in agreement with them as to be addressed in the treatment plan.      Short Term  Goals: (6 weeks)  1. Patient will show decreased girth in B LE by up to 1 cm to allow for LE symmetry, shoe and clothing choice, and ability to apply needed compression.  (progressing, not met)   2. Patient will demonstrate 100% knowledge of lymphedema precautions and signs of infection to allow for reduced lymphedema risk, infection risk, and/or exacerbation of condition.  (progressing, not met)  3. Patient or caregiver will perform self-bandaging techniques and/or wearing of compression garments to allow for lymphatic drainage support, skin elasticity, and reduction in shape and size of limb. (progressing, not met)  4. Patient will perform self lymph drainage techniques to areas within reach to enhance lymphatic drainage and skin condition.  (progressing, not met)  5. Patient will tolerate daily activities with multilayered bandaging to allow for lymphatic and venous support.  (progressing, not met)     Long Term Goals: (12  weeks)  1. Patient will show decreased girth in B LE by up to 1 cm  to allow for LE symmetry, shoe and clothing choice, and ability to apply needed compression daily.  (progressing, not met)  2. Patient will show reduction in density to mild or less with improved contour of limb to allow for cosmesis, LE symmetry, infection risk reduction, and clothing and compression choice.   (progressing, not met)  3. Patient to ashley/doff compression garment with daily compliance to assist in lymphedema management, skin elasticity, and tissue density.  (progressing, not met)  4. Pt to show improved postural awareness and alignment.  (progressing, not met)  5. Pt to be I and compliant with HEP to allow for increased function in affected limb.   (progressing, not met)  Plan   Plan of care Certification: 9/30/2024 to 12/24/24.     Outpatient Physical Therapy 1 times weekly for 10 weeks to include the following interventions: Patient Education, Self Care, Therapeutic Activities, and Therapeutic Exercise.  Complete Decongestive Therapy- compression and home equipment needs to be addressed and assisted.    Essence Cobos, PT

## 2024-11-01 ENCOUNTER — CLINICAL SUPPORT (OUTPATIENT)
Dept: REHABILITATION | Facility: HOSPITAL | Age: 64
End: 2024-11-01
Payer: COMMERCIAL

## 2024-11-01 DIAGNOSIS — M79.89 SWELLING OF LOWER EXTREMITY: ICD-10-CM

## 2024-11-01 DIAGNOSIS — I89.0 LYMPHEDEMA OF BOTH LOWER EXTREMITIES: Primary | ICD-10-CM

## 2024-11-01 PROCEDURE — 97016 VASOPNEUMATIC DEVICE THERAPY: CPT

## 2024-11-01 PROCEDURE — 97140 MANUAL THERAPY 1/> REGIONS: CPT

## 2024-11-01 PROCEDURE — 97535 SELF CARE MNGMENT TRAINING: CPT

## 2024-11-05 DIAGNOSIS — I89.0 LYMPHEDEMA: ICD-10-CM

## 2024-11-05 DIAGNOSIS — R60.9 LIPEDEMA: Primary | ICD-10-CM

## 2024-11-06 ENCOUNTER — CLINICAL SUPPORT (OUTPATIENT)
Dept: REHABILITATION | Facility: HOSPITAL | Age: 64
End: 2024-11-06
Payer: COMMERCIAL

## 2024-11-06 ENCOUNTER — TELEPHONE (OUTPATIENT)
Dept: VASCULAR SURGERY | Facility: CLINIC | Age: 64
End: 2024-11-06
Payer: COMMERCIAL

## 2024-11-06 DIAGNOSIS — M79.89 SWELLING OF LOWER EXTREMITY: ICD-10-CM

## 2024-11-06 DIAGNOSIS — I89.0 LYMPHEDEMA: ICD-10-CM

## 2024-11-06 DIAGNOSIS — R60.9 LIPEDEMA: ICD-10-CM

## 2024-11-06 DIAGNOSIS — I89.0 LYMPHEDEMA OF BOTH LOWER EXTREMITIES: Primary | ICD-10-CM

## 2024-11-06 PROCEDURE — 97140 MANUAL THERAPY 1/> REGIONS: CPT

## 2024-11-06 PROCEDURE — 97016 VASOPNEUMATIC DEVICE THERAPY: CPT

## 2024-11-06 NOTE — TELEPHONE ENCOUNTER
Called pt. regarding order place for her to have physical therapy  to BUE as requested by therapist. No answer and unable to leave message.

## 2024-11-06 NOTE — PROGRESS NOTES
Physical Therapy Daily Treatment Note/Reassessment     Name: Khushbu Davis  Clinic Number: [id number]    Therapy Diagnosis:   Encounter Diagnoses   Name Primary?    Lipedema     Lymphedema     Lymphedema of both lower extremities Yes    Swelling of lower extremity      Physician: Hoang Roper MD    Visit Date: 11/6/2024    Physician Orders: PT Eval and Treat - lymphedema  Medical Diagnosis from Referral:   Updated referral 11/5/24 per Dr. Roper for B UE / B LE     Diagnosis Information    Diagnosis   R60.9 (ICD-10-CM) - Lipedema   I89.0 (ICD-10-CM) - Lymphedema     Diagnosis   I89.0 (ICD-10-CM) - Lymphedema, not elsewhere classified   M79.89 (ICD-10-CM) - Other specified soft tissue disorders      Free Text Diagnosis   I89.0 (ICD-10-CM) - Lymphedema M79.89 (ICD-10-CM) - Leg swelling   Evaluation Date: 9/30/2024  Authorization Period Expiration: 12/31/24  Plan of Care Expiration: 12/24/24  Visit # / Visits authorized: 5/20     Time In: 210p  Time Out: 310p  Total Billable Time: 60 minutes  pump     Precautions: Standard    Subjective     Pt reports: she is requesting Lipedema and Lymphedema management for both arms and both legs- Dr. Roper has updated orders.   She is still considering a home pump system but has high co payment.   She was compliant with home compression/exercise program.  Response to previous treatment: she is wearing TH garments daily - has 3 pairs.  Pt reports good support, slight size reductions.  Pt notes generalized bruising, multiple visits and tests including MRI of her neck showing arthritis. She has been followed in Vascular, Orthopedics, and may consider Rheumatology.   Her Left arm is always painful more so than R,  R may have more size/shape.  She has inflammation and tenderness throughout.   [facility].   She understands lipedema and lipo lymphedema characteristics. Generalized tenderness to L UE especially with touch.  Legs are less sensitive but  "tender with pressing down.    Functional change: amb no device, working  Pain: 2/10  Location: bilateral BLE mostly tender to touch, admits more pain in L UE   Pain L UE 6/10 throughout, up and down her arm  Pain R UE 3/10 generalized     Objective   Female amb to dept no device, wearing Sigvaris TH 20-30mmHg B LE, tennis shoes  Amount of Swelling/Location of Swelling: mild/mod soft fullness to B LE ankles, lower legs, triangular soft fullness thighs   Skin Integrity: fatty distributions prox leg, ankles, thighs   Palpation/Texture: soft fullness, density, no pitting, tender to deep touch, broken veins   - B Stemmer Sign  - B Neo's Sign  Circulation: intact      Girth Measurements (in centimeters)  LANDMARK LEFT LE  9/30/24 L LE  11/6/24 Change  L RIGHT LE  9/30/24 R LE  11/6/24 Change  R DIFF   at eval   SBP + 10  58.0 cm 58.0 0 58.0 cm 57.0 1.0 0 cm   SBP 49.0 cm 48.5 0.5 49.5 cm 47.0 2.5 0.5 cm   10 below SBP 43.0 cm 40.0 3.0 43.0 cm 41.0 2.0 0 cm   20 below SBP 41.0 cm 39.5 1.5 39.0 cm 39.0 0 2.0 cm   30 below SBP 32.5 cm 30.5 2.0 31.0 cm 31.0 0 1.5 cm   35 below SBP 28.0 cm 26.5 1.5 26.5 cm 26.5 0 1.5 cm   Ankle 26.0 cm 25.0 1.0 26.5 cm 25.5 1.0 0.5 cm   Forefoot 21.5 cm 20.5 1.0 21.0 cm 20.5 0.5 0.5 cm     Consent for photograph for medical documentation     R handed  Soft fullness throughout  BUE, tenderness to deep palpation  Cyst near R index finger     Girth Measurements (in centimeters)  LANDMARK RIGHT UE  11/6/24 LEFT UE  11/6/24   E + 6" 36.0 cm 37.0 cm   E + 4" 36.5 cm 36.0 cm   E + 2" 32.5 cm 33.0 cm   Elbow 28.5 cm 28.0 cm   W+ 8" 29.0 cm 28.5 cm   W +  6" 28.0 cm 27.0 cm   W + 4" 24.5 cm 23.5 cm   Wrist 17.5 cm 17.0 cm   DPC 19.5 cm 19.0 cm   IP Thumb 6.3 cm 6.5 cm     Treatment:     Khushbu received the following manual therapy techniques:- Manual Lymphatic Drainage were applied to the: B LE for 55 minutes, including: MLD and short stretch compression bandaging   Assessment of B UE     MANUAL " LYMPHATIC DRAINAGE (MLD):    While supine with LEs elevated stimulation at terminus, along GI region, B inguinal regions, drainage of entire R LE fred lower leg, ankle, and foot with return proximally,  Use of Aquaphor/Eucerin due to dryness.   Consider self massage to abdominal areas, B inguinal areas, thigh, and remaining LE within reach.    SEQUENTIAL COMPRESSION PUMP: full leg sleeve applied to one LE  while  simultaneous to Manual Lymphatic Drainage, MLD,  to  other  LE  VES 5200 with default setting with distal pressures starting at 45mmHg entire LE   Simultaneous to compression supplies, compression education and training, and self management.    MULTILAYERED BANDAGING: issued supplies and bandaged R and L  LE with cotton stockinette, cotton padding, 1 8cm and 1-10cm Rosidal K rolls foot to knee, Edema Wear Large to thigh to leave intact 12-24 hrs as tolerated, discontinue with any problems, return rolled bandages next session. Wash and wear schedules confirmed.    Khushbu received therapeutic exercises to develop strength, endurance, ROM, flexibility, and posture  including: aps, walking, avoid dependency and immobility, support of muscle pump with compression and activity.  THERAPEUTIC EXERCISES:  Continue HEP of AROM, stretching, and postural correction.     Home Exercises Provided and Patient Education Provided:  Self Care Home Management Training/Functional Therapeutic Activity x 5 minutes  UE vs LE, lymphedema vs lipedema  Home pump considerations   Referral received to add B UE management  Pt request for consideration of B UE re therapy, compression and home pump sleeves.    B LE, orders per Dr. Roper for leg swelling - will need additional orders as warranted for UE compression and UE home pump sleeves if warranted per MD.     discussed options with Lipo Lymphedema vs Lymphedema vs Lipedema-product selection, treatment components, and consults pending.  Compression, mobility and goals of management  regarding lipo lymphedema and self massage for B LE as well as upper body and L UE.   Compression choices in style, class and product - past visit given suggestions for Bioflect style support.   Pump consult status:  Reports deductible with $400-$800 her responsibility - reviewed pump use and goals of treatment - B LE leg sleeves / pump unit per her insurance and vendor consulted by Dr. Roper.     B  LE  leg swelling, Lymphedema,and updated orders/referral to treat UE.  Present compression:  Sigvaris TH 20-30mmHg x 3    Prior instructions in compression choices:  lipedema and lymphedema compression choices, reviewed class, style of product and sizing selection if choosing to self order.   Options for Bioflect or Solidea - leggings tights  Consideration for leggings, pantyhose, B UE sleeves or Bolero styling.   Orders and recommendations: daily compression,  TH, KH + tights, leggings, Dr. Roper -pump consult to [facility],  Therapy forwarded supporting documentation 10/2/24.- pt choice pending   PATIENT/FAMILY Education: bandaging/compression wear schedule,  HEP,  Beginning of self massage,  Self or assisted bandaging, compression options, and Risk reduction    Written Home Exercises Provided: Patient instructed to cont prior HEP.  Home exercise and compression plan of management was reviewed and Khushbu was able to demonstrate understanding prior to the end of the session.  Khushbu demonstrated good  understanding of the education provided.     Assessment   Khushbu DONATO is a 64 y.o. female referred to outpatient Physical Therapy with a medical diagnosis of   Diagnosis   I89.0 (ICD-10-CM) - Lymphedema, not elsewhere classified   M79.89 (ICD-10-CM) - Other specified soft tissue disorders      Free Text Diagnosis   I89.0 (ICD-10-CM) - Lymphedema M79.89 (ICD-10-CM) - Leg swelling   This patient presents s/p LE pain and edema with lipedema characteristics resulting in: mild lymphedema of the B LE, increased pain, increased  stiffness in the soft tissues and tenderness, size/shape changes, bruising, broken veins, as well as difficulty performing weight loss/management , compression needs, and placing the pt at higher risk of infection. Will initiate Complete Decongestive Therapy, CDT, to assist in symptom relief and management.     Additional referral received for evaluation and treatment of  BUE with considerations for lipedema, Lymphedema, and lipo lymphedema.  Pt has been successfully supporting her B LE with TH garments, exercise and activity.  B LE with some reductions in girth, soft fullness persists with tissue distribution.  Pt is wearing compression daily for B LE and remains in communication with vendor for home pump system due to her cost.    B UE with soft fullness, tenderness, bruising often typical characteristics associated with Lipedema.  Reviewed pain may have other causative factors, tenderness to touch is typical presentation.  Pt does admit following with MRI for cervical region showing OA/DJD, orthopedics, and PCP in addition to Vascular as well as Lipedema specialist.  Advised therapy will use components of Complete Decongestive Therapy, CDT, and recommend compression and exercises that may assist her symptoms.  Focus will shift to more symptomatic L  UE to determine benefits of therapy.     Lipo  lymphedema vs lipedema characteristics and management plans.    Khushbu Is progressing well towards her goals.   Pt prognosis is Good.     Pt will continue to benefit from skilled outpatient physical therapy to address the deficits listed in the problem list box on initial evaluation, provide pt/family education and to maximize pt's level of independence in the home and community environment.     Pt's spiritual, cultural and educational needs considered and pt agreeable to plan of care and goals.    Anticipated Barriers for therapy: cost of copay, shoulder/neck PT needs     The following goals were discussed with the  patient and patient is in agreement with them as to be addressed in the treatment plan.      Short Term Goals: (6 weeks)  1. Patient will show decreased girth in B LE by up to 1 cm to allow for LE symmetry, shoe and clothing choice, and ability to apply needed compression.  (met)   2. Patient will demonstrate 100% knowledge of lymphedema precautions and signs of infection to allow for reduced lymphedema risk, infection risk, and/or exacerbation of condition.   met)  3. Patient or caregiver will perform self-bandaging techniques and/or wearing of compression garments to allow for lymphatic drainage support, skin elasticity, and reduction in shape and size of limb.  met)  4. Patient will perform self lymph drainage techniques to areas within reach to enhance lymphatic drainage and skin condition.  (progressing,  5. Patient will tolerate daily activities with multilayered bandaging to allow for lymphatic and venous support.  met)     Long Term Goals: (12  weeks)  1. Patient will show decreased girth in B LE by up to 1 cm  to allow for LE symmetry, shoe and clothing choice, and ability to apply needed compression daily.  (progressing, not met)  2. Patient will show reduction in density to mild or less with improved contour of limb to allow for cosmesis, LE symmetry, infection risk reduction, and clothing and compression choice.  met)  3. Patient to ashley/doff compression garment with daily compliance to assist in lymphedema management, skin elasticity, and tissue density. met)  4. Pt to show improved postural awareness and alignment.  (progressing,  5. Pt to be I and compliant with HEP to allow for increased function in affected limb.   (progressing,ongoing     Goals to address pain and symptomatic management of B UE. (6 weeks)  1. Patient to show decreased girth in B UE by up to 1cm to allow for improved UE symmetry, clothing choice, ROM, and UE function.  (progressing, not met)  2. Patient will show reduction in  density to mild or less with improved contour of limb to allow for improved cosmesis, improved lymphatic drainage, and functional mobility.  (progressing, not met)  3. Patient to ashley/doff compression garment with daily compliance to support lymphatic mobility and skin elasticity.  (progressing, not met)  4. Pt to show improved postural awareness and alignment.  (progressing, not met)  5. Pt to be I and compliant with HEP to allow for improved ROM, reach and carry with functional endurance and strength.    (progressing, not met)  Plan   Plan of care Certification: 9/30/2024 to 12/24/24.     Outpatient Physical Therapy 1 times weekly for 10 weeks to include the following interventions: Patient Education, Self Care, Therapeutic Activities, and Therapeutic Exercise. Complete Decongestive Therapy- compression and home equipment needs to be addressed and assisted.    Focus to shift to UE, continue sequential compression pump and compression garments for B LE.     Essence Cobos, PT

## 2024-11-13 ENCOUNTER — CLINICAL SUPPORT (OUTPATIENT)
Dept: REHABILITATION | Facility: HOSPITAL | Age: 64
End: 2024-11-13
Payer: COMMERCIAL

## 2024-11-13 DIAGNOSIS — M79.89 SWELLING OF LOWER EXTREMITY: ICD-10-CM

## 2024-11-13 DIAGNOSIS — I89.0 LYMPHEDEMA OF BOTH LOWER EXTREMITIES: Primary | ICD-10-CM

## 2024-11-13 DIAGNOSIS — R60.9 LIPEDEMA: ICD-10-CM

## 2024-11-13 PROCEDURE — 97140 MANUAL THERAPY 1/> REGIONS: CPT

## 2024-11-13 PROCEDURE — 97016 VASOPNEUMATIC DEVICE THERAPY: CPT

## 2024-11-13 NOTE — PROGRESS NOTES
Physical Therapy Daily Treatment Note/Reassessment     Name: Khushbu Davis  Clinic Number: [id number]    Therapy Diagnosis:   Encounter Diagnoses   Name Primary?    Lymphedema of both lower extremities Yes    Swelling of lower extremity     Lipedema      Physician: Hoang Roper MD    Visit Date: 11/13/2024    Physician Orders: PT Eval and Treat - lymphedema  Medical Diagnosis from Referral:   Updated referral 11/5/24 per Dr. Roper for B UE / B LE     Diagnosis Information    Diagnosis   R60.9 (ICD-10-CM) - Lipedema   I89.0 (ICD-10-CM) - Lymphedema     Diagnosis   I89.0 (ICD-10-CM) - Lymphedema, not elsewhere classified   M79.89 (ICD-10-CM) - Other specified soft tissue disorders      Free Text Diagnosis   I89.0 (ICD-10-CM) - Lymphedema M79.89 (ICD-10-CM) - Leg swelling   Evaluation Date: 9/30/2024  Authorization Period Expiration: 12/31/24  Plan of Care Expiration: 12/24/24  Visit # / Visits authorized: 6/20     Time In: 300p  Time Out:400p  Total Billable Time: 60 minutes  Pump for  BLE     Precautions: Standard    Subjective     Pt reports: pain and stiffness to L shoulder, whole arm can ache especially in mornings.  Pt has had Cervical scans but will also seek orthopedics assessment of her shoulder.    She was compliant with home compression/exercise program.  Response to previous treatment: wearing TH garments to both legs daily - supportive and good fit.    L UE was diagnosed Lipedema by specialist in [facility] and Dr. Roper added to therapy consult.   Functional change: amb no device, working, difficulty reaching behind her back with L  Pain:  7/10 L UE  Location: bilateral BLE mostly tender to touch, admits more pain in L UE   Left arm was hurting today,  also in mornings, painful achy- will consider orthopedics to assess.     Objective   Female amb to dept no device, wearing Sigvaris TH 20-30mmHg B LE, tennis shoes  Amount of Swelling/Location of Swelling: mild/mod soft fullness to B LE ankles,  "lower legs, triangular soft fullness thighs   Soft tissue fullness and general tenderness to B UE L > R - no pitting, no density  Skin Integrity: fatty distributions prox leg, ankles, thighs   Palpation/Texture: soft fullness, density, no pitting, tender to deep touch, broken veins   - B Stemmer Sign  - B Neo's Sign  Circulation: intact      L UE propped supine- * pt will consult orthopedics for formal shoulder assessment  Arom  SH flex 125 onset of pulling post sh/145  Sh abd tendency to compensate forward 95/105 stiff   Aarom at 90  Sh ER 15-20 with stiffness onset  Sh IR 60    Sitting active motions- slight compensations  Sh flex 150  Able to reach back of head B UE- ER  Mid to low back - IR, stiffness      Girth Measurements (in centimeters)  LANDMARK RIGHT UE  11/6/24 LEFT UE  11/6/24   E + 6" 36.0 cm 37.0 cm   E + 4" 36.5 cm 36.0 cm   E + 2" 32.5 cm 33.0 cm   Elbow 28.5 cm 28.0 cm   W+ 8" 29.0 cm 28.5 cm   W +  6" 28.0 cm 27.0 cm   W + 4" 24.5 cm 23.5 cm   Wrist 17.5 cm 17.0 cm   DPC 19.5 cm 19.0 cm   IP Thumb 6.3 cm 6.5 cm     Treatment:   Khushbu received the following manual therapy techniques:- Manual Lymphatic Drainage were applied to the: L UE  for 45 minutes, including: MLD and compression     MANUAL LYMPHATIC DRAINAGE:  Drainage of L upper quadrant and L UE   While supine with arm elevated,  Drainage along neck, B subclavian regions,  Across ant chest and top of shoulder,  Axillary regions,  drainage of entire UE especially upper arm, forearm, wrist and hand with return of all areas proximally,   Assisted mobility of L sh, gentle oscillations for relaxation, aarom     MULTILAYERED BANDAGING:  not performed  Issued tubigrip segments E and F for L UE - use daily, reviewed position, fit and goals of lite compression.   Use daytime, remove if ever painful or tight.  Pt to reapply TH garments to B LE- daily wear     SEQUENTIAL COMPRESSION PUMP: full leg sleeve applied to B LE while  simultaneous to Manual " Lymphatic Drainage, MLD,  of L UE.  VES 5200 with default setting with distal pressures starting at 45mmHg entire LE   Simultaneous to compression supplies, compression education and training, and self management.    Khushbu received therapeutic exercises to develop strength, endurance, ROM, flexibility, and posture  Instructed in and performed  Upper body and spine postural correction  Sh arom, painfree or limited ROM  Scapular retractions  Scaption to 90 holds  aps, walking, avoid dependency and immobility, support of muscle pump with compression and activity.  THERAPEUTIC EXERCISES:  Continue HEP of AROM, stretching, and postural correction.     Home Exercises Provided and Patient Education Provided:  Self Care Home Management Training/Functional Therapeutic Activity x 5 minutes  UE vs LE, lymphedema vs lipedema  Shoulder orthopedic assessment - L pain end range and limitations in Sh abd, er and flex, reports generalized weakness, night pain    B LE and ? UE Home pump considerations - MD would need to clarify orders if appropriate, presently sent consult for B LE.    Referral received to add B UE management to therapy.   Pt request for consideration of B UE re therapy, compression and home pump sleeves.    B LE, orders per Dr. Roper for leg swelling - will need additional orders as warranted for UE compression, UE home pump sleeves if warranted per MD.     discussed options with Lipo Lymphedema vs Lymphedema vs Lipedema-product selection, treatment components, and consults pending.  Compression, mobility and goals of management regarding lipo lymphedema and self massage for B LE as well as upper body and L UE.   Compression choices in style, class and product - past visit given suggestions for Bioflect style support.   Pump consult status:  Reports deductible with $400-$800 her responsibility - reviewed pump use and goals of treatment - B LE leg sleeves / pump unit per her insurance and vendor consulted by   Jacquelin.     NAHUM  LE  leg swelling, Lymphedema,and updated orders/referral to treat UE.  Present compression:  Sigvaris TH 20-30mmHg x 3    Prior instructions in compression choices:  lipedema and lymphedema compression choices, reviewed class, style of product and sizing selection if choosing to self order.   Options for Bioflect or Solidea - leggings tights  Consideration for leggings, pantyhose, B UE sleeves or Bolero styling.   Orders and recommendations: daily compression,  TH, KH + tights, leggings, Dr. Roper -pump consult to [facility],  Therapy forwarded supporting documentation 10/2/24.- pt choice pending   PATIENT/FAMILY Education: bandaging/compression wear schedule,  HEP,  Beginning of self massage,  Self or assisted bandaging, compression options, and Risk reduction    Written Home Exercises Provided: Patient instructed to cont prior HEP.  Home exercise and compression plan of management was reviewed and Khushbu was able to demonstrate understanding prior to the end of the session.  Khushbu demonstrated good  understanding of the education provided.     Assessment   Khushbu DONATO is a 64 y.o. female referred to outpatient Physical Therapy with a medical diagnosis of   Diagnosis   I89.0 (ICD-10-CM) - Lymphedema, not elsewhere classified   M79.89 (ICD-10-CM) - Other specified soft tissue disorders      Free Text Diagnosis   I89.0 (ICD-10-CM) - Lymphedema M79.89 (ICD-10-CM) - Leg swelling   This patient presents s/p LE pain and edema with lipedema characteristics resulting in: mild lymphedema of the B LE, increased pain, increased stiffness in the soft tissues and tenderness, size/shape changes, bruising, broken veins, as well as difficulty performing weight loss/management , compression needs, and placing the pt at higher risk of infection. Will initiate Complete Decongestive Therapy, CDT, to assist in symptom relief and management.     Feel L shoulder may have musculo skeletal limitations - weakness, limited ROM,  altered mechanics, altered postural alignment, and pain.    Referral was added for lipo lipedema or considerations for L UE symptom management with Complete Decongestive Therapy, CDT.  Performing drainage massage, lite compression, and exercise to address lymphatic mobility of L UE but will also need further orthopedic assessment.     B LE receiving compression and pump support for Lipo Lymphedema and recommendations for self care plan.   Khushbu Is progressing well towards her goals.   Pt prognosis is Good.     Pt will continue to benefit from skilled outpatient physical therapy to address the deficits listed in the problem list box on initial evaluation, provide pt/family education and to maximize pt's level of independence in the home and community environment.     Pt's spiritual, cultural and educational needs considered and pt agreeable to plan of care and goals.    Anticipated Barriers for therapy: cost of copay, shoulder/neck PT needs     The following goals were discussed with the patient and patient is in agreement with them as to be addressed in the treatment plan.     UE goals x 4 weeks:  1. Patient to show decreased girth in L UE by up to 1cm to allow for improved UE symmetry, clothing choice, ROM, and UE function.  (progressing, not met)  2. Patient will show reduction in density to mild or less with improved contour of limb to allow for improved cosmesis, improved lymphatic drainage, and functional mobility.  (progressing, not met)  3. Patient to ashley/doff compression garment with daily compliance to support lymphatic mobility and skin elasticity.  (progressing, not met)  4. Pt to show improved postural awareness and alignmentt.  (progressing, not met)  5. Pt to be I and compliant with HEP to allow for improved ROM to allow reach and carry with functional endurance and strength.    (progressing, not met)     Short Term Goals: (6 weeks)  1. Patient will show decreased girth in B LE by up to 1 cm to allow  for LE symmetry, shoe and clothing choice, and ability to apply needed compression.  (met)   2. Patient will demonstrate 100% knowledge of lymphedema precautions and signs of infection to allow for reduced lymphedema risk, infection risk, and/or exacerbation of condition.   met)  3. Patient or caregiver will perform self-bandaging techniques and/or wearing of compression garments to allow for lymphatic drainage support, skin elasticity, and reduction in shape and size of limb.  met)  4. Patient will perform self lymph drainage techniques to areas within reach to enhance lymphatic drainage and skin condition.  (progressing,  5. Patient will tolerate daily activities with multilayered bandaging to allow for lymphatic and venous support.  met)     Long Term Goals: (12  weeks)  1. Patient will show decreased girth in B LE by up to 1 cm  to allow for LE symmetry, shoe and clothing choice, and ability to apply needed compression daily.  (progressing, not met)  2. Patient will show reduction in density to mild or less with improved contour of limb to allow for cosmesis, LE symmetry, infection risk reduction, and clothing and compression choice.  met)  3. Patient to ashley/doff compression garment with daily compliance to assist in lymphedema management, skin elasticity, and tissue density. met)  4. Pt to show improved postural awareness and alignment.  (progressing,  5. Pt to be I and compliant with HEP to allow for increased function in affected limb.   (progressing,ongoing     Goals to address pain and symptomatic management of B UE. (6 weeks)  1. Patient to show decreased girth in B UE by up to 1cm to allow for improved UE symmetry, clothing choice, ROM, and UE function.  (progressing, not met)  2. Patient will show reduction in density to mild or less with improved contour of limb to allow for improved cosmesis, improved lymphatic drainage, and functional mobility.  (progressing, not met)  3. Patient to ashley/doff  compression garment with daily compliance to support lymphatic mobility and skin elasticity.  (progressing, not met)  4. Pt to show improved postural awareness and alignment.  (progressing, not met)  5. Pt to be I and compliant with HEP to allow for improved ROM, reach and carry with functional endurance and strength.    (progressing, not met)  Plan   Plan of care Certification: 9/30/2024 to 12/24/24.     Outpatient Physical Therapy 1 times weekly for 10 weeks to include the following interventions: Patient Education, Self Care, Therapeutic Activities, and Therapeutic Exercise. Complete Decongestive Therapy- compression and home equipment needs to be addressed and assisted.    Focus to shift to UE, continue sequential compression pump and compression garments for B LE.   1 x weekly L UE     Essence Cobos, PT

## 2024-11-20 ENCOUNTER — CLINICAL SUPPORT (OUTPATIENT)
Dept: REHABILITATION | Facility: HOSPITAL | Age: 64
End: 2024-11-20
Payer: COMMERCIAL

## 2024-11-20 DIAGNOSIS — M79.89 SWELLING OF LOWER EXTREMITY: ICD-10-CM

## 2024-11-20 DIAGNOSIS — R60.9 LIPEDEMA: ICD-10-CM

## 2024-11-20 DIAGNOSIS — I89.0 LYMPHEDEMA OF BOTH LOWER EXTREMITIES: Primary | ICD-10-CM

## 2024-11-20 PROCEDURE — 97016 VASOPNEUMATIC DEVICE THERAPY: CPT

## 2024-11-20 PROCEDURE — 97140 MANUAL THERAPY 1/> REGIONS: CPT

## 2024-11-20 NOTE — PROGRESS NOTES
Physical Therapy Daily Treatment Note/Reassessment     Name: Khushbu Davis  Clinic Number: [id number]    Therapy Diagnosis:   Encounter Diagnoses   Name Primary?    Lymphedema of both lower extremities Yes    Swelling of lower extremity     Lipedema      Physician: Hoang Roper MD    Visit Date: 11/20/2024    Physician Orders: PT Eval and Treat - lymphedema  Medical Diagnosis from Referral:   Updated referral 11/5/24 per Dr. Roper for B UE / B LE     Diagnosis Information    Diagnosis   R60.9 (ICD-10-CM) - Lipedema   I89.0 (ICD-10-CM) - Lymphedema     Diagnosis   I89.0 (ICD-10-CM) - Lymphedema, not elsewhere classified   M79.89 (ICD-10-CM) - Other specified soft tissue disorders      Free Text Diagnosis   I89.0 (ICD-10-CM) - Lymphedema M79.89 (ICD-10-CM) - Leg swelling   Evaluation Date: 9/30/2024  Authorization Period Expiration: 12/31/24  Plan of Care Expiration: 12/24/24  Visit # / Visits authorized: 7/20     Time In: 300p  Time Out: 350p  Total Billable Time: 50 minutes  Pump for  BLE     Precautions: Standard    Subjective     Pt reports: seeing Orthopedic doctor with [employer] system - received steroid injections to her L thumb and L shoulder with less pain reported - allowed tolerance of a gym class.  Pt is being sent for MRI to assess L shoulder pain/stiffness.     She was compliant with home compression/exercise program.  Response to previous treatment: continuous review of Lipedema vs Lymphedema vs Lipo lymphedema for  B LE as well as shoulder dysfunction + lipedema L UE.     Pt is using lite compression of tubigrip to L UE daytime and continues daily use of medical grade 20-30mmHg TH garments for both LE.   MD follow up by vascular 12/4/24, orthopedics pending MRI for L shoulder.    Functional change: pt is able  to self apply TH garments.  Consideration for lite sleeves L UE as lipedema support or no compression is primary pain onset is OA/DJD/orthopedic following.   Pain:  4/10 L  "UE  Location: bilateral BLE mostly tender to touch, admits more pain in L UE   Left arm was hurting today,  also in mornings, painful achy- will consider orthopedics to assess.     Objective   Female amb to dept no device, wearing Sigvaris TH 20-30mmHg B LE, tennis shoes  Amount of Swelling/Location of Swelling: mild/mod soft fullness to B LE ankles, lower legs, triangular soft fullness thighs   Soft tissue fullness and general tenderness to B UE L > R - no pitting, no density- bandaid L sh post injection, small bruise L thumb post infection  Skin Integrity: fatty distributions prox leg, ankles, thighs   Palpation/Texture: soft fullness, density, no pitting, tender to deep touch, broken veins   - B Stemmer Sign  - B Neo's Sign  Circulation: intact      L UE propped supine 2 pillows for neck and shoulder comfort      Girth Measurements (in centimeters)  LANDMARK RIGHT UE  11/6/24 LEFT UE  11/6/24   E + 6" 36.0 cm 37.0 cm   E + 4" 36.5 cm 36.0 cm   E + 2" 32.5 cm 33.0 cm   Elbow 28.5 cm 28.0 cm   W+ 8" 29.0 cm 28.5 cm   W +  6" 28.0 cm 27.0 cm   W + 4" 24.5 cm 23.5 cm   Wrist 17.5 cm 17.0 cm   DPC 19.5 cm 19.0 cm   IP Thumb 6.3 cm 6.5 cm     Treatment:   Khushbu received the following manual therapy techniques:- Manual Lymphatic Drainage were applied to the: L UE  for 40 minutes, including: MLD and compression     MANUAL LYMPHATIC DRAINAGE:  Drainage of L upper quadrant and L UE   While supine with arm elevated,  Drainage along neck, B subclavian regions,  Across ant chest and top of shoulder,  Axillary regions,  drainage of entire UE especially upper arm, forearm, wrist and hand with return of all areas proximally,   Assisted mobility of L sh, gentle oscillations for relaxation, aarom     MULTILAYERED BANDAGING:  not performed  Issued tubigrip segments E and F for L UE - use daily, reviewed position, fit and goals of lite compression.   Reapplied to L UE  Use daytime, remove if ever painful or tight.  Pt to reapply " TH garments to B LE- daily wear     SEQUENTIAL COMPRESSION PUMP: full leg sleeve applied to B LE while  simultaneous to Manual Lymphatic Drainage, MLD,  of L UE.  VES 5200 with default setting with distal pressures starting at 45mmHg entire LE   Simultaneous to compression supplies, compression education and training, and self management.    Khushbu received therapeutic exercises to develop strength, endurance, ROM, flexibility, and posture  Trained in postural correction, alignment, avoid irritation or pain with motions  Shoulder body mechanics and muscle activation.   Continue:  Upper body and spine postural correction  Sh arom, painfree or limited ROM  Scapular retractions  Scaption to 90 holds  aps, walking, avoid dependency and immobility, support of muscle pump with compression and activity.  THERAPEUTIC EXERCISES:  Continue HEP of AROM, stretching, and postural correction.     Home Exercises Provided and Patient Education Provided:  Self Care Home Management Training/Functional Therapeutic Activity x 5 minutes  UE vs LE, lymphedema vs lipedema  Shoulder orthopedic assessment - L sh pain end range, decreased ROM, decreased function, generalized weakness, night pain- lipedema with pain to touch, touch sensitivity- pt admits added pain with Blood pressure monitoring L UE   Admits pain is reduced post injections L sh and L  thumb, ortho pending with MRI     B LE and ? UE Home pump considerations - MD would need to clarify orders if appropriate, presently sent consult for B LE- pt is choosing if to proceed due to cost.    Referral received to add B UE management to therapy lipedema / lymphedema  Pt request for consideration of B UE re therapy, compression and home pump sleeves.  B LE, orders per Dr. Roper for leg swelling - will need additional orders as warranted for UE compression, UE home pump sleeves if warranted per MD.     discussed options with Lipo Lymphedema vs Lymphedema vs Lipedema-product selection,  treatment components, and consults pending.  Compression, mobility and goals of management regarding lipo lymphedema and self massage for B LE as well as upper body and L UE.   Compression choices in style, class and product - past visit given suggestions for Bioflect style support.   Pump consult status:  Reports deductible with $400-$800 her responsibility - reviewed pump use and goals of treatment - B LE leg sleeves / pump unit per her insurance and vendor consulted by Dr. Roper.     B  LE  leg swelling, Lymphedema,and updated orders/referral to treat UE.  Present compression:  Sigvaris TH 20-30mmHg x 3    Prior instructions in compression choices:  lipedema and lymphedema compression choices, reviewed class, style of product and sizing selection if choosing to self order.   Options for Bioflect or Solidea - leggings tights  Consideration for leggings, pantyhose, B UE sleeves or Bolero styling.   Orders and recommendations: daily compression,  TH, KH + tights, leggings, Dr. Roper -pump consult to [facility],  Therapy forwarded supporting documentation 10/2/24.- pt choice pending   PATIENT/FAMILY Education: bandaging/compression wear schedule,  HEP,  Beginning of self massage,  Self or assisted bandaging, compression options, and Risk reduction    Written Home Exercises Provided: Patient instructed to cont prior HEP.  Home exercise and compression plan of management was reviewed and Khushbu was able to demonstrate understanding prior to the end of the session.  Khushbu demonstrated good  understanding of the education provided.     Assessment   Khushbu DONATO is a 64 y.o. female referred to outpatient Physical Therapy with a medical diagnosis of   Diagnosis   I89.0 (ICD-10-CM) - Lymphedema, not elsewhere classified   M79.89 (ICD-10-CM) - Other specified soft tissue disorders      Free Text Diagnosis   I89.0 (ICD-10-CM) - Lymphedema M79.89 (ICD-10-CM) - Leg swelling   This patient presents s/p LE pain and edema with  lipedema characteristics resulting in: mild lymphedema of the B LE, increased pain, increased stiffness in the soft tissues and tenderness, size/shape changes, bruising, broken veins, as well as difficulty performing weight loss/management , compression needs, and placing the pt at higher risk of infection. Will initiate Complete Decongestive Therapy, CDT, to assist in symptom relief and management.   Referral was added for lipo lipedema or considerations for L UE symptom management with Complete Decongestive Therapy, CDT.  Performing drainage massage, lite compression, and exercise to address lymphatic mobility of L UE but will also need further orthopedic assessment.   B LE receiving compression and pump support for Lipo Lymphedema B LE and recommendations for self care plan.    Pt is seeing orthopedics to determine if L arm and shoulder pain could have an musculo skeletal component vs Lipedema pain/sensitivity issues.  Pt feels pump and compression are aiding in management of her B LE lipo lymphedema.  She unfortunately has high cost/copayments for medical testing, medical equipment and will have $800 for home pump system therefore will discuss again with Dr. Roper 12/4/24.  Therapy is adding Manual Lymphatic Drainage, MLD, and lite compression to her L UE for management of fullness, pain, lipedema characteristics.   Suspect L shoulder with OA/DJD/orthopedic related musculo skeletal limitations contributing to weakness, limited ROM, altered mechanics, altered postural alignment, and pain.      Khushbu Is progressing well towards her goals.   Pt prognosis is Good.     Pt will continue to benefit from skilled outpatient physical therapy to address the deficits listed in the problem list box on initial evaluation, provide pt/family education and to maximize pt's level of independence in the home and community environment.     Pt's spiritual, cultural and educational needs considered and pt agreeable to plan of care  and goals.    Anticipated Barriers for therapy: cost of copay, shoulder/neck PT needs     The following goals were discussed with the patient and patient is in agreement with them as to be addressed in the treatment plan.     UE goals x 4 weeks:  1. Patient to show decreased girth in L UE by up to 1cm to allow for improved UE symmetry, clothing choice, ROM, and UE function.  (progressing, not met)  2. Patient will show reduction in density to mild or less with improved contour of limb to allow for improved cosmesis, improved lymphatic drainage, and functional mobility.  (progressing, not met)  3. Patient to ashley/doff compression garment with daily compliance to support lymphatic mobility and skin elasticity.  (progressing, not met)  4. Pt to show improved postural awareness and alignmentt.  (progressing, not met)  5. Pt to be I and compliant with HEP to allow for improved ROM to allow reach and carry with functional endurance and strength.    (progressing, not met)     Short Term Goals: (6 weeks)  1. Patient will show decreased girth in B LE by up to 1 cm to allow for LE symmetry, shoe and clothing choice, and ability to apply needed compression.  (met)   2. Patient will demonstrate 100% knowledge of lymphedema precautions and signs of infection to allow for reduced lymphedema risk, infection risk, and/or exacerbation of condition.   met)  3. Patient or caregiver will perform self-bandaging techniques and/or wearing of compression garments to allow for lymphatic drainage support, skin elasticity, and reduction in shape and size of limb.  met)  4. Patient will perform self lymph drainage techniques to areas within reach to enhance lymphatic drainage and skin condition.  (progressing,  5. Patient will tolerate daily activities with multilayered bandaging to allow for lymphatic and venous support.  met)     Long Term Goals: (12  weeks)  1. Patient will show decreased girth in B LE by up to 1 cm  to allow for LE  symmetry, shoe and clothing choice, and ability to apply needed compression daily.  met)  2. Patient will show reduction in density to mild or less with improved contour of limb to allow for cosmesis, LE symmetry, infection risk reduction, and clothing and compression choice.  met)  3. Patient to ashley/doff compression garment with daily compliance to assist in lymphedema management, skin elasticity, and tissue density. met)  4. Pt to show improved postural awareness and alignment.  (progressing,  5. Pt to be I and compliant with HEP to allow for increased function in affected limb.   (progressing,ongoing     Goals to address pain and symptomatic management of B UE. (6 weeks)  1. Patient to show decreased girth in B UE by up to 1cm to allow for improved UE symmetry, clothing choice, ROM, and UE function.  (progressing, not met)  2. Patient will show reduction in density to mild or less with improved contour of limb to allow for improved cosmesis, improved lymphatic drainage, and functional mobility.  (progressing, not met)  3. Patient to ashley/doff compression garment with daily compliance to support lymphatic mobility and skin elasticity.  (progressing, not met)  4. Pt to show improved postural awareness and alignment.  (progressing, not met)  5. Pt to be I and compliant with HEP to allow for improved ROM, reach and carry with functional endurance and strength.    (progressing, not met)  Plan   Plan of care Certification: 9/30/2024 to 12/24/24.     Outpatient Physical Therapy 1 times weekly for 10 weeks to include the following interventions: Patient Education, Self Care, Therapeutic Activities, and Therapeutic Exercise. Complete Decongestive Therapy- compression and home equipment needs to be addressed and assisted.    Focus to shift to UE, continue sequential compression pump and compression garments for B LE.   1 x weekly L UE + vascular and orthopedics following.     Essence Cobos, PT

## 2024-11-29 ENCOUNTER — CLINICAL SUPPORT (OUTPATIENT)
Dept: REHABILITATION | Facility: HOSPITAL | Age: 64
End: 2024-11-29
Payer: COMMERCIAL

## 2024-11-29 DIAGNOSIS — R60.9 LIPEDEMA: ICD-10-CM

## 2024-11-29 DIAGNOSIS — M79.89 SWELLING OF LOWER EXTREMITY: ICD-10-CM

## 2024-11-29 DIAGNOSIS — I89.0 LYMPHEDEMA OF BOTH LOWER EXTREMITIES: Primary | ICD-10-CM

## 2024-11-29 PROCEDURE — 97535 SELF CARE MNGMENT TRAINING: CPT

## 2024-11-29 PROCEDURE — 97016 VASOPNEUMATIC DEVICE THERAPY: CPT

## 2024-11-29 PROCEDURE — 97140 MANUAL THERAPY 1/> REGIONS: CPT

## 2024-11-29 NOTE — PROGRESS NOTES
Physical Therapy Daily Treatment Note     Name: Khushbu Davis  Clinic Number: [id number]    Therapy Diagnosis:   Encounter Diagnoses   Name Primary?    Lymphedema of both lower extremities Yes    Swelling of lower extremity     Lipedema      Physician: Hoang Roper MD    Visit Date: 11/29/2024    Physician Orders: PT Eval and Treat - lymphedema  Medical Diagnosis from Referral:   Updated referral 11/5/24 per Dr. Roper for B UE / B LE     Diagnosis Information    Diagnosis   R60.9 (ICD-10-CM) - Lipedema   I89.0 (ICD-10-CM) - Lymphedema     Diagnosis   I89.0 (ICD-10-CM) - Lymphedema, not elsewhere classified   M79.89 (ICD-10-CM) - Other specified soft tissue disorders      Free Text Diagnosis   I89.0 (ICD-10-CM) - Lymphedema M79.89 (ICD-10-CM) - Leg swelling   Evaluation Date: 9/30/2024  Authorization Period Expiration: 12/31/24  Plan of Care Expiration: 12/24/24  Visit # / Visits authorized: 8/20     Time In: 950a  Time Out: 1050a  Total Billable Time: 60 minutes  Pump for B LE     Precautions: Standard    Subjective     Pt reports: shoulder is much better after injection with Orthopedics.  Pt feels therapy and massage have been helpful for her arm and legs.    Shoulder will likely have MRI in beginning of new year.  She was compliant with home compression/exercise program.  Response to previous treatment: wearing TH most days and is aware of support provided.  She will see Dr. Roper 12/4/24 to discuss home pump and if need for new compression options for her UE.  Pt has 3 pairs TH with some insurance coverage for cost. Pt used [employer] for TH.   Reviewed UE options with lessor class and Lipedema style product considerations.   Functional change: tolerating more ease of movement L sh since injection.  Independent with TH garments.  Slight roll down with tubigrip L arm- suggested discontinue top Size F and try size G at top..    Pain:  2/10 L UE  Location: bilateral BLE mostly tender to  "touch, admits more pain in L UE   Admits feels more symptoms with cold weather.    Objective   Female amb to dept no device, wearing Sigvaris TH 20-30mmHg B LE, tennis shoes  L UE tubigrip F + E with roll down at top - discontinue  Amount of Swelling/Location of Swelling: mild/mod soft fullness to B LE ankles, lower legs, triangular soft fullness thighs   Soft tissue fullness and general tenderness to B UE L > R - no pitting, no density- bandaid L sh post injection, small bruise L thumb post infection  Skin Integrity: fatty distributions prox leg, ankles, thighs   Palpation/Texture: soft fullness, density, no pitting, tender to deep touch, broken veins   - B Stemmer Sign  - B Neo's Sign  Circulation: intact      L UE propped supine + table/pillows for neck and shoulder comfort      Girth Measurements (in centimeters)  LANDMARK RIGHT UE  11/6/24 LEFT UE  11/6/24   E + 6" 36.0 cm 37.0 cm   E + 4" 36.5 cm 36.0 cm   E + 2" 32.5 cm 33.0 cm   Elbow 28.5 cm 28.0 cm   W+ 8" 29.0 cm 28.5 cm   W +  6" 28.0 cm 27.0 cm   W + 4" 24.5 cm 23.5 cm   Wrist 17.5 cm 17.0 cm   DPC 19.5 cm 19.0 cm   IP Thumb 6.3 cm 6.5 cm     Treatment:   Khushbu received the following manual therapy techniques:- Manual Lymphatic Drainage were applied to the: L UE  for 45 minutes, including: MLD and compression     MANUAL LYMPHATIC DRAINAGE:  Drainage of L upper quadrant and L UE   While supine with arm elevated,  Drainage along neck, B subclavian regions,  Across ant chest and top of shoulder,  Axillary regions,  drainage of entire UE especially upper arm, forearm, wrist and hand with return of all areas proximally,     MULTILAYERED BANDAGING:  not performed  Issued tubigrip segments E for lower arm and changed to G for upper arm - advised basic compression as temporary support daytime and options for true sleeves with MD orders.   use daily, reviewed position, fit and goals of lite compression.   Reapplied to L UE  Use daytime, remove if ever painful " or tight.  Pt able to self reapply TH garments to B LE- daily wear     SEQUENTIAL COMPRESSION PUMP: full leg sleeve applied to B LE while  simultaneous to Manual Lymphatic Drainage, MLD,  of L UE.  VES 5200 with default setting with distal pressures starting at 45mmHg entire LE   Simultaneous to compression supplies, compression education and training, and self management.    Khushbu received therapeutic exercises to develop strength, endurance, ROM, flexibility, and posture  postural correction, alignment, avoid irritation or pain with motions  Shoulder body mechanics and muscle activation.   Continue:  Upper body and spine postural correction  Sh arom, painfree or limited ROM  Scapular retractions  Scaption to 90 holds  aps, walking, avoid dependency and immobility, support of muscle pump with compression and activity.  THERAPEUTIC EXERCISES:  Continue HEP of AROM, stretching, and postural correction.     Home Exercises Provided and Patient Education Provided:  Self Care Home Management Training/Functional Therapeutic Activity x 15 minutes  UE vs LE, lymphedema vs lipedema, class and product review, management suggestions  Sleeve suggestions L and consider R UE as needed  Order suggestions and products to be discussed with vascular MD 12/4/24.  Clinic, catalog and computer demo of potential products, DME support, Insurance coverage support, and self pay options.    Shoulder per Orthopedics, MRI pending    Referral  B LE and B UE management to therapy lipedema / lymphedema  Original B LE, orders per Dr. Roper for leg swelling   Pt to discuss UE compression, UE home pump sleeves if warranted per MD.     Lipo Lymphedema vs Lymphedema vs Lipedema-product selection, treatment components, and consults pending.  Suggest UE 15-20mmHg or lessor class, medical grade garments or considerations for Bioflect or Solidea Lipedema styles/class,  advised on DME support with medical brands like Juzo, Jobst, Sigvaris, etc.    Compression, mobility and goals of management regarding lipo lymphedema  including compression, exercise, self massage   Compression choices in style, class and product - past visit given suggestions for Bioflect style support of Bolero for B UE or micro massage weave of product  .   Pump consult status:  B LE and ? UE Home pump considerations - MD would need to clarify orders if appropriate, consult for B LE, pending, choice due to cost.  Reports deductible with $400-$800 her responsibility - pump use and goals of treatment: B LE leg sleeves / pump unit per her insurance and vendor consulted by Dr. Roper.     B  LE  leg swelling, Lymphedema,and updated orders/referral to treat UE.  Present compression:  Sigvaris TH 20-30mmHg x 3  Prior instructions in compression choices:  lipedema and lymphedema compression choices, reviewed class, style of product and sizing selection if choosing to self order.   Options for Bioflect or Solidea - leggings tights  Consideration for leggings, pantyhose, B UE sleeves or Bolero styling.   Orders and recommendations: daily compression,  TH, KH + tights, leggings, Dr. Roper -pump consult to [facility],  Therapy forwarded supporting documentation 10/2/24.- pt choice pending   UE compression - will need new orders  PATIENT/FAMILY Education: bandaging/compression wear schedule,  HEP,  Beginning of self massage,  Self or assisted bandaging, compression options, and Risk reduction    Written Home Exercises Provided: Patient instructed to cont prior HEP.  Home exercise and compression plan of management was reviewed and Khushbu was able to demonstrate understanding prior to the end of the session.  Khushbu demonstrated good  understanding of the education provided.     Assessment   Khushbu DONATO is a 64 y.o. female referred to outpatient Physical Therapy with a medical diagnosis of   Diagnosis   I89.0 (ICD-10-CM) - Lymphedema, not elsewhere classified   M79.89 (ICD-10-CM) - Other specified soft  tissue disorders      Free Text Diagnosis   I89.0 (ICD-10-CM) - Lymphedema M79.89 (ICD-10-CM) - Leg swelling   This patient presents s/p LE pain and edema with lipedema characteristics resulting in: mild lymphedema of the B LE, increased pain, increased stiffness in the soft tissues and tenderness, size/shape changes, bruising, broken veins, as well as difficulty performing weight loss/management , compression needs, and placing the pt at higher risk of infection. Will initiate Complete Decongestive Therapy, CDT, to assist in symptom relief and management.   Referral was added for lipo lipedema or considerations for L UE symptom management with Complete Decongestive Therapy, CDT.  Performing drainage massage, lite compression, and exercise to address lymphatic mobility of L UE but will also need further orthopedic assessment.   B LE receiving compression and pump support for Lipo Lymphedema B LE and recommendations for self care plan.    Lipo Lymphedema of B LE being managed with compression choices, exercise and activity, pump support and therapy.  L UE has less pain reported post shoulder injections and following with orthopedics.  L UE may benefit from lite compression sleeve support of 15-20mmHg or Lipedema styles to assist in her management.    Review of potential products and management plans moving forward.    Process for home pump with limitations in cost by patient responsibility.    Home pump would be an additional tool in her management plans.  Pt choice to discuss options with her referring provider Dr. Roper 12/4/24.     Khushbu Is progressing well towards her goals.   Pt prognosis is Good.     Pt will continue to benefit from skilled outpatient physical therapy to address the deficits listed in the problem list box on initial evaluation, provide pt/family education and to maximize pt's level of independence in the home and community environment.     Pt's spiritual, cultural and educational needs  considered and pt agreeable to plan of care and goals.    Anticipated Barriers for therapy: cost of copay, shoulder/neck PT needs     The following goals were discussed with the patient and patient is in agreement with them as to be addressed in the treatment plan.     UE goals x 4 weeks:  1. Patient to show decreased girth in L UE by up to 1cm to allow for improved UE symmetry, clothing choice, ROM, and UE function.  (progressing, not met)  2. Patient will show reduction in density to mild or less with improved contour of limb to allow for improved cosmesis, improved lymphatic drainage, and functional mobility.  (progressing, not met)  3. Patient to ashley/doff compression garment with daily compliance to support lymphatic mobility and skin elasticity.  (progressing, not met)  4. Pt to show improved postural awareness and alignmentt.  (progressing, not met)  5. Pt to be I and compliant with HEP to allow for improved ROM to allow reach and carry with functional endurance and strength.    (progressing, not met)     Short Term Goals: (6 weeks)  1. Patient will show decreased girth in B LE by up to 1 cm to allow for LE symmetry, shoe and clothing choice, and ability to apply needed compression.  (met)   2. Patient will demonstrate 100% knowledge of lymphedema precautions and signs of infection to allow for reduced lymphedema risk, infection risk, and/or exacerbation of condition.   met)  3. Patient or caregiver will perform self-bandaging techniques and/or wearing of compression garments to allow for lymphatic drainage support, skin elasticity, and reduction in shape and size of limb.  met)  4. Patient will perform self lymph drainage techniques to areas within reach to enhance lymphatic drainage and skin condition.  (progressing,  5. Patient will tolerate daily activities with multilayered bandaging to allow for lymphatic and venous support.  met)     Long Term Goals: (12  weeks)  1. Patient will show decreased girth in  B LE by up to 1 cm  to allow for LE symmetry, shoe and clothing choice, and ability to apply needed compression daily.  met)  2. Patient will show reduction in density to mild or less with improved contour of limb to allow for cosmesis, LE symmetry, infection risk reduction, and clothing and compression choice.  met)  3. Patient to ashley/doff compression garment with daily compliance to assist in lymphedema management, skin elasticity, and tissue density. met)  4. Pt to show improved postural awareness and alignment.  (progressing,  5. Pt to be I and compliant with HEP to allow for increased function in affected limb.   (progressing,ongoing     Goals to address pain and symptomatic management of B UE. (6 weeks)  1. Patient to show decreased girth in B UE by up to 1cm to allow for improved UE symmetry, clothing choice, ROM, and UE function.  (progressing, not met)  2. Patient will show reduction in density to mild or less with improved contour of limb to allow for improved cosmesis, improved lymphatic drainage, and functional mobility.  (progressing, not met)  3. Patient to ashley/doff compression garment with daily compliance to support lymphatic mobility and skin elasticity.  (progressing, not met)  4. Pt to show improved postural awareness and alignment.  (progressing, not met)  5. Pt to be I and compliant with HEP to allow for improved ROM, reach and carry with functional endurance and strength.    (progressing, not met)  Plan   Plan of care Certification: 9/30/2024 to 12/24/24.     Outpatient Physical Therapy 1 times weekly for 10 weeks to include the following interventions: Patient Education, Self Care, Therapeutic Activities, and Therapeutic Exercise. Complete Decongestive Therapy- compression and home equipment needs to be addressed and assisted.    Focus to shift to UE, continue sequential compression pump and compression garments for B LE.   1 x weekly L UE + vascular and orthopedics following.     Essence  Papito, PT

## 2024-12-04 ENCOUNTER — OFFICE VISIT (OUTPATIENT)
Dept: VASCULAR SURGERY | Facility: CLINIC | Age: 64
End: 2024-12-04
Payer: COMMERCIAL

## 2024-12-04 VITALS
BODY MASS INDEX: 33.22 KG/M2 | HEART RATE: 106 BPM | DIASTOLIC BLOOD PRESSURE: 111 MMHG | HEIGHT: 61 IN | SYSTOLIC BLOOD PRESSURE: 168 MMHG | WEIGHT: 175.94 LBS

## 2024-12-04 DIAGNOSIS — I89.0 LYMPHEDEMA: Primary | ICD-10-CM

## 2024-12-04 DIAGNOSIS — R60.9 LIPEDEMA: ICD-10-CM

## 2024-12-04 PROCEDURE — 3077F SYST BP >= 140 MM HG: CPT | Mod: CPTII,S$GLB,, | Performed by: SURGERY

## 2024-12-04 PROCEDURE — 1159F MED LIST DOCD IN RCRD: CPT | Mod: CPTII,S$GLB,, | Performed by: SURGERY

## 2024-12-04 PROCEDURE — 99999 PR PBB SHADOW E&M-EST. PATIENT-LVL III: CPT | Mod: PBBFAC,,, | Performed by: SURGERY

## 2024-12-04 PROCEDURE — 3008F BODY MASS INDEX DOCD: CPT | Mod: CPTII,S$GLB,, | Performed by: SURGERY

## 2024-12-04 PROCEDURE — 99214 OFFICE O/P EST MOD 30 MIN: CPT | Mod: S$GLB,,, | Performed by: SURGERY

## 2024-12-04 PROCEDURE — 3080F DIAST BP >= 90 MM HG: CPT | Mod: CPTII,S$GLB,, | Performed by: SURGERY

## 2024-12-04 NOTE — PROGRESS NOTES
Hoang Roper MD, RPVI                                 [facility]                           [facility]                             2024    HPI:  Khushbu Davis is a 64 y.o. female with   Patient Active Problem List   Diagnosis    Lymphedema of both lower extremities    Swelling of lower extremity    Lipedema    being managed by PCP and specialists who is here today for evaluation of BLE edema.  Patient states location is BLE occurring for yrs.  Associated signs and symptoms include pain.  Quality is heavy and severity is 6/10.  Symptoms began yrs ago.  Alleviating factors include elevation.  Worsening factors include dependency.  Patient has not been wearing compression stockings for greater than 3 months.  Symptoms do limit patient's functional status and daily activities.  no DVT history.  no venous interventions.  no low sodium diet.  no excessive water intake.    Migraine with aura: no  PFO/ASD/right to left shunt: no  Pregnant: no  Breastfeeding: no    no MI  no Stroke  Tobacco use: denies    2024:  c/o BLE edema despite compression and exercise > 3 mo.  +low Na diet.  +lymphedema therapy, no pumps due to finances.    Past Medical History:   Diagnosis Date    Anxiety     Depression     Hypertension      Past Surgical History:   Procedure Laterality Date    BELT ABDOMINOPLASTY       SECTION      CHOLECYSTECTOMY       No family history on file.  Social History     Socioeconomic History    Marital status:    Tobacco Use    Smoking status: Never     Social Drivers of Health     Financial Resource Strain: Low Risk  (2024)    Overall Financial Resource Strain (CARDIA)     Difficulty of Paying Living Expenses: Not hard at all   Food Insecurity: No Food Insecurity (2024)    Hunger Vital Sign     Worried About Running Out of Food in the Last Year: Never true     Ran Out of Food in the Last Year: Never true   Transportation Needs: No Transportation  Needs (6/12/2024)    Received from [facility] - Transportation     Lack of Transportation (Medical): No     Lack of Transportation (Non-Medical): No   Physical Activity: Sufficiently Active (8/22/2024)    Exercise Vital Sign     Days of Exercise per Week: 5 days     Minutes of Exercise per Session: 30 min   Stress: No Stress Concern Present (8/22/2024)    [employer] [place] [employer] - Occupational Stress Questionnaire     Feeling of Stress : Only a little   Housing Stability: Unknown (8/22/2024)    Housing Stability Vital Sign     Unable to Pay for Housing in the Last Year: No       Current Outpatient Medications:     fluticasone propionate (FLONASE) 50 mcg/actuation nasal spray, 1 spray (50 mcg total) by Each Nostril route 2 (two) times daily., Disp: 9.9 mL, Rfl: 0    traZODone (DESYREL) 100 MG tablet, Take 100 mg by mouth every evening., Disp: , Rfl:     UNKNOWN TO PATIENT, , Disp: , Rfl:     amLODIPine (NORVASC) 10 MG tablet, Take 10 mg by mouth once daily., Disp: , Rfl:     azelastine (ASTELIN) 137 mcg (0.1 %) nasal spray, 1 spray (137 mcg total) by Nasal route 2 (two) times daily. for 10 days, Disp: 30 mL, Rfl: 0    REVIEW OF SYSTEMS:  General: No fevers or chills; ENT: No sore throat; Allergy and Immunology: no persistent infections; Hematological and Lymphatic: No history of bleeding or easy bruising; Endocrine: negative; Respiratory: no cough, shortness of breath, or wheezing; Cardiovascular: no chest pain or dyspnea on exertion; Gastrointestinal: no abdominal pain/back, change in bowel habits, or bloody stools; Genito-Urinary: no dysuria, trouble voiding, or hematuria; Musculoskeletal: edema; Neurological: no TIA or stroke symptoms; Psychiatric: no nervousness, anxiety or depression.    PHYSICAL EXAM:      Pulse: 106         General appearance:  Alert, well-appearing, and in no distress.  Oriented to person, place, and time                    Neurological: Normal speech, no focal  "findings noted; CN II - XII grossly intact. RLE with sensation to light touch, LLE with sensation to light touch.            Musculoskeletal: Digits/nail without cyanosis/clubbing.  Strength 5/5 BLE.                    Neck: Supple, no significant adenopathy                  Chest:  No wheezes, symmetric air entry. No use of accessory muscles               Cardiac: Normal rate and regular rhythm            Abdomen: Soft, nontender, nondistended      Extremities:   2+ R DP pulse, 2+ L DP pulse      1+ RLE edema, 1+ LLE edema    Skin:  RLE no ulcer; LLE no ulcer      RLE no spider veins, LLE no spider veins      RLE no varicose veins, LLE no varicose veins    CEAP 3/3    VCSS 6    LAB RESULTS:  No results found for: "CBC"  No results found for: "LABPROT", "INR"  Lab Results   Component Value Date     (L) 03/15/2022    K 3.8 03/15/2022    CO2 24 03/15/2022    BUN 18.7 (H) 03/15/2022    CREATININE 0.71 03/15/2022    CALCIUM 9.6 03/15/2022    ANIONGAP 4 (L) 03/15/2022     No results found for: "WBC", "RBC", "HGB", "HCT", "MCV", "MCH", "MCHC", "RDW", "PLT", "MPV", "GRAN", "LYMPH", "MONO", "EOS", "BASO", "EOSINOPHIL", "BASOPHIL", "DIFFMETHOD"  .No results found for: "HGBA1C"    IMAGING:  All pertinent imaging has been reviewed and interpreted independently.    Venous US 8/2024 Impression:  Color flow evaluation of the right lower extremity demonstrates no evidence of venous thrombosis in the deep or superficial veins, and no reflux.  Color flow evaluation of the left lower extremity demonstrates no evidence of venous thrombosis in the deep or superficial veins, and no reflux.    IMP/PLAN:  64 y.o. female with   Patient Active Problem List   Diagnosis    Lymphedema of both lower extremities    Swelling of lower extremity    Lipedema    being managed by PCP and specialists who is here today for evaluation of BLE edema, lipedema and lymphedema.    -recommend compression with Rx stockings, elevation, dietary changes " associated with water and sodium intake discussed at length with patient  -Exercise   -Patient is diagnosed with lipedema and lymphedema and presents with hyperpigmentation of the lower extremity.  Patient has previously attempted compression, elevation, and exercise for at least 4 weeks.  Patient has secondary lymphedema and has tried and failed compression of 20-30 mm Hg, elevation and exercise for >1 month period however symptoms persist.  Basic pump trial performed and discontinued due to sensitivity and pain to static pressure.  Symptoms of swelling, pain and hyperplasia present.  A compression device is recommended to treat current symptoms and prevent disease progression. The patient has tried elevation, exercise x 4 weeks and compression and symptoms remain.  The patient has marked hyperkeratosis with hyperplasia and hyperpigmentation. Pt has been compliant with diet/med eval, MLD, skin care  - recommend lymphedema clinic therapy and pumps  -compression sleeve for BUE lymphedema and lipedema  -RTC 3 months for further evaluation    I spent 12 minutes evaluating this patient and greater than 50% of the time was spent counseling, coordinator care and discussing the plan of care.  All questions were answered and patient stated understanding with agreement with the above treatment plan.    Hoang Roper MD [facility]  Vascular and Endovascular Surgery

## 2024-12-06 ENCOUNTER — CLINICAL SUPPORT (OUTPATIENT)
Dept: REHABILITATION | Facility: HOSPITAL | Age: 64
End: 2024-12-06
Payer: COMMERCIAL

## 2024-12-06 DIAGNOSIS — M79.89 SWELLING OF LOWER EXTREMITY: ICD-10-CM

## 2024-12-06 DIAGNOSIS — R60.9 LIPEDEMA: ICD-10-CM

## 2024-12-06 DIAGNOSIS — I89.0 LYMPHEDEMA OF BOTH LOWER EXTREMITIES: Primary | ICD-10-CM

## 2024-12-06 PROCEDURE — 97016 VASOPNEUMATIC DEVICE THERAPY: CPT

## 2024-12-06 PROCEDURE — 97535 SELF CARE MNGMENT TRAINING: CPT

## 2024-12-06 PROCEDURE — 97140 MANUAL THERAPY 1/> REGIONS: CPT

## 2024-12-06 NOTE — PROGRESS NOTES
Physical Therapy Daily Treatment Note     Name: Khushbu Davis  Clinic Number: [id number]    Therapy Diagnosis:   Encounter Diagnoses   Name Primary?    Lymphedema of both lower extremities Yes    Swelling of lower extremity     Lipedema      Physician: Hoang Roper MD    Visit Date: 12/6/2024    Physician Orders: PT Eval and Treat - lymphedema  Medical Diagnosis from Referral:   Updated referral 11/5/24 per Dr. Roper for B UE / B LE     Diagnosis Information    Diagnosis   R60.9 (ICD-10-CM) - Lipedema   I89.0 (ICD-10-CM) - Lymphedema     Diagnosis   I89.0 (ICD-10-CM) - Lymphedema, not elsewhere classified   M79.89 (ICD-10-CM) - Other specified soft tissue disorders      Free Text Diagnosis   I89.0 (ICD-10-CM) - Lymphedema M79.89 (ICD-10-CM) - Leg swelling   Evaluation Date: 9/30/2024  Authorization Period Expiration: 12/31/24  Plan of Care Expiration: 12/24/24  Visit # / Visits authorized: 9/20     Time In: 200p  Time Out: 300p  Total Billable Time: 60 minutes  Pump for B LE     Precautions: Standard    Subjective     Pt reports: saw Dr. Roper recommends compression arm sleeve for L UE - has orders,  and continue therapy.  Pt is still considering input regarding lipedema liposuction surgical procedures.   MD suggested home pump system if she can afford copayments.   She was compliant with home compression/exercise program.  Response to previous treatment: using TH garments, will visit DME for arm sleeve orders.  L sh is better since injection, but will have orthopedics / MRI following in new year.   Reviewed conservative management and plan for self programs.   Functional change: amb no device, working  Shoulder with more movement post injection but will still have assessment.    Pain:  2/10 L UE  Location: bilateral BLE mostly tender to touch, admits more pain in L UE   Admits feels more symptoms with cold weather.    Objective   Female amb to dept no device, wearing Sigvaris TH 20-30mmHg B LE,  "tennis shoes  Amount of Swelling/Location of Swelling: mild/mod soft fullness to B LE ankles, lower legs, triangular soft fullness thighs   Soft tissue fullness and general tenderness to B UE L > R - no pitting, no density- bandaid L sh post injection, small bruise L thumb post infection  Skin Integrity: fatty distributions prox leg, ankles, thighs   Palpation/Texture: soft fullness, density, no pitting, tender to deep touch, broken veins   - B Stemmer Sign  - B Neo's Sign  Circulation: intact      L UE propped supine + table/pillows for neck and shoulder comfort      Girth Measurements (in centimeters)  LANDMARK RIGHT UE  11/6/24 LEFT UE  11/6/24   E + 6" 36.0 cm 37.0 cm   E + 4" 36.5 cm 36.0 cm   E + 2" 32.5 cm 33.0 cm   Elbow 28.5 cm 28.0 cm   W+ 8" 29.0 cm 28.5 cm   W +  6" 28.0 cm 27.0 cm   W + 4" 24.5 cm 23.5 cm   Wrist 17.5 cm 17.0 cm   DPC 19.5 cm 19.0 cm   IP Thumb 6.3 cm 6.5 cm     Treatment:   Khushbu received the following manual therapy techniques:- Manual Lymphatic Drainage were applied to the: L UE  for 35 minutes, including: MLD and compression     MANUAL LYMPHATIC DRAINAGE:  Drainage of L upper quadrant and L UE   While supine with arm elevated,  Drainage along neck, B subclavian regions,  Across ant chest and top of shoulder,  Axillary regions,  drainage of entire UE especially upper arm, forearm, wrist and hand with return of all areas proximally,     MULTILAYERED BANDAGING:  not performed  Pt forgot and has long sleeve fitted shirt - unable to add compression.  Pt has tubigrip segments E for lower arm and G for upper arm - advised basic compression as temporary support daytime and options for arm compression sleeves - therapy discussed 15-20mmHg or lite lipedema styles for UE  Compression orders per Dr. Roper - pt has copy and DME list    Pt able to self reapply TH garments to B LE- daily wear     SEQUENTIAL COMPRESSION PUMP: full leg sleeve applied to B LE while  simultaneous to Manual " Lymphatic Drainage, MLD,  of L UE.  VES 5200 with default setting with distal pressures starting at 45mmHg entire LE   Simultaneous to compression supplies, compression education and training, and self management.    Khushbu received therapeutic exercises to develop strength, endurance, ROM, flexibility, and posture  postural correction, alignment, avoid irritation or pain with motions  Shoulder body mechanics and muscle activation.   Continue:  Upper body and spine postural correction  Sh arom, painfree or limited ROM  Scapular retractions  Scaption to 90 holds  aps, walking, avoid dependency and immobility, support of muscle pump with compression and activity.  THERAPEUTIC EXERCISES:  Continue HEP of AROM, stretching, and postural correction.     Home Exercises Provided and Patient Education Provided:  Self Care Home Management Training/Functional Therapeutic Activity x 15 minutes  UE vs LE, lymphedema vs lipedema, class and product review, management suggestions  B LE lipo lymphedema, L UE lipedema management referred by Dr. Roper  Pt is considering Lipedema procedures and potential protocols regarding compression garment support discussed- all procedures and protocols per medical teams  Therapy being conservative management.     Sleeve suggestions L UE and consider R UE as needed- 15-20mmHg or 18-21mmHg lipedema class  MD orders UE compression sleeve 12/4/24  products, DME support, Insurance coverage support, and self pay options.    Shoulder per Orthopedics, MRI pending    Lipo Lymphedema vs Lymphedema vs Lipedema-product selection, treatment components, and consults  Suggest UE 15-20mmHg or lessor class, medical grade garments or considerations for Bioflect or Solidea Lipedema styles/class,  advised on DME support with medical brands like Juzo, Jobst, Sigvaris, etc.   Compression, mobility and goals of management regarding lipo lymphedema  including compression, exercise, self massage   Compression choices in  style, class and product - suggestions for Bioflect style support of Bolero for B UE or micro massage weave of product  .   Pump consult status:  Home pump considerations - MD recommends proceed if finances allow   Reports deductible with $400-$800 her responsibility - pump use and goals of treatment: B LE leg sleeves / pump unit   insurance and vendor consulted by Dr. Roper.     B  LE  leg swelling, Lymphedema,and updated orders/referral to treat UE.  Present compression:  Sigvaris TH 20-30mmHg x 3  compression choices, reviewed class, style of product and sizing selection if choosing to self order.   Options for Bioflect or Solidea - leggings tights  Consideration for leggings, pantyhose, B UE sleeves or Bolero styling.   Orders and recommendations: daily compression,  TH, KH + tights, leggings, Dr. Roper -pump consult to [facility],  Therapy forwarded supporting documentation 10/2/24.- pt choice pending   UE compression - will need new orders  PATIENT/FAMILY Education: bandaging/compression wear schedule,  HEP,  Beginning of self massage,  Self or assisted bandaging, compression options, and Risk reduction    Written Home Exercises Provided: Patient instructed to cont prior HEP.  Home exercise and compression plan of management was reviewed and Khushbu was able to demonstrate understanding prior to the end of the session.  Khushbu demonstrated good  understanding of the education provided.     Assessment   Khushbu DONATO is a 64 y.o. female referred to outpatient Physical Therapy with a medical diagnosis of   Diagnosis   I89.0 (ICD-10-CM) - Lymphedema, not elsewhere classified   M79.89 (ICD-10-CM) - Other specified soft tissue disorders      Free Text Diagnosis   I89.0 (ICD-10-CM) - Lymphedema M79.89 (ICD-10-CM) - Leg swelling   This patient presents s/p LE pain and edema with lipedema characteristics resulting in: mild lymphedema of the B LE, increased pain, increased stiffness in the soft tissues and tenderness,  size/shape changes, bruising, broken veins, as well as difficulty performing weight loss/management , compression needs, and placing the pt at higher risk of infection. Will initiate Complete Decongestive Therapy, CDT, to assist in symptom relief and management.   Referral was added for lipo lipedema or considerations for L UE symptom management with Complete Decongestive Therapy, CDT.  Performing drainage massage, lite compression, and exercise to address lymphatic mobility of L UE but will also need further orthopedic assessment.   B LE receiving compression and pump support for Lipo Lymphedema B LE and recommendations for self care plan.    Therapy components are conservative measures to manage and support Lipo Lymphedema of B LE and lipedema considerations of B UE L >R.  Any surgical or procedural recommendations or consults will have to provide benefits of procedure as well as protocol of management.  Reviewed most procedures have compression requirements post procedure.  Pt notes some benefits to sessions, she will seek new compression arm sleeve and will consider home pump once finances allow.   Pt is wearing TH garments daily.     Khushbu Is progressing well towards her goals.   Pt prognosis is Good.     Pt will continue to benefit from skilled outpatient physical therapy to address the deficits listed in the problem list box on initial evaluation, provide pt/family education and to maximize pt's level of independence in the home and community environment.     Pt's spiritual, cultural and educational needs considered and pt agreeable to plan of care and goals.    Anticipated Barriers for therapy: cost of copay, shoulder/neck PT needs     The following goals were discussed with the patient and patient is in agreement with them as to be addressed in the treatment plan.     UE goals x 4 weeks:  1. Patient to show decreased girth in L UE by up to 1cm to allow for improved UE symmetry, clothing choice, ROM, and UE  function.  (progressing, not met)  2. Patient will show reduction in density to mild or less with improved contour of limb to allow for improved cosmesis, improved lymphatic drainage, and functional mobility.  (progressing, not met)  3. Patient to ashley/doff compression garment with daily compliance to support lymphatic mobility and skin elasticity.  (progressing, not met)  4. Pt to show improved postural awareness and alignmentt.  (progressing, not met)  5. Pt to be I and compliant with HEP to allow for improved ROM to allow reach and carry with functional endurance and strength.    (progressing, not met)     Short Term Goals: (6 weeks)  1. Patient will show decreased girth in B LE by up to 1 cm to allow for LE symmetry, shoe and clothing choice, and ability to apply needed compression.  (met)   2. Patient will demonstrate 100% knowledge of lymphedema precautions and signs of infection to allow for reduced lymphedema risk, infection risk, and/or exacerbation of condition.   met)  3. Patient or caregiver will perform self-bandaging techniques and/or wearing of compression garments to allow for lymphatic drainage support, skin elasticity, and reduction in shape and size of limb.  met)  4. Patient will perform self lymph drainage techniques to areas within reach to enhance lymphatic drainage and skin condition.  (progressing,  5. Patient will tolerate daily activities with multilayered bandaging to allow for lymphatic and venous support.  met)     Long Term Goals: (12  weeks)  1. Patient will show decreased girth in B LE by up to 1 cm  to allow for LE symmetry, shoe and clothing choice, and ability to apply needed compression daily.  met)  2. Patient will show reduction in density to mild or less with improved contour of limb to allow for cosmesis, LE symmetry, infection risk reduction, and clothing and compression choice.  met)  3. Patient to ashley/doff compression garment with daily compliance to assist in lymphedema  management, skin elasticity, and tissue density. met)  4. Pt to show improved postural awareness and alignment.  (progressing,  5. Pt to be I and compliant with HEP to allow for increased function in affected limb.   (progressing,ongoing     Goals to address pain and symptomatic management of B UE. (6 weeks)  1. Patient to show decreased girth in B UE by up to 1cm to allow for improved UE symmetry, clothing choice, ROM, and UE function.  (progressing, not met)  2. Patient will show reduction in density to mild or less with improved contour of limb to allow for improved cosmesis, improved lymphatic drainage, and functional mobility.  (progressing, not met)  3. Patient to ashley/doff compression garment with daily compliance to support lymphatic mobility and skin elasticity.  (progressing, not met)  4. Pt to show improved postural awareness and alignment.  (progressing, not met)  5. Pt to be I and compliant with HEP to allow for improved ROM, reach and carry with functional endurance and strength.    (progressing, not met)  Plan   Plan of care Certification: 9/30/2024 to 12/24/24.     Outpatient Physical Therapy 1 times weekly for 10 weeks to include the following interventions: Patient Education, Self Care, Therapeutic Activities, and Therapeutic Exercise. Complete Decongestive Therapy- compression and home equipment needs to be addressed and assisted.    Focus to shift to UE, continue sequential compression pump and compression garments for B LE.   1 x weekly L UE + vascular and orthopedics following.     Essence Cobos, PT

## 2024-12-09 ENCOUNTER — CLINICAL SUPPORT (OUTPATIENT)
Dept: REHABILITATION | Facility: HOSPITAL | Age: 64
End: 2024-12-09
Payer: COMMERCIAL

## 2024-12-09 DIAGNOSIS — M79.89 SWELLING OF LOWER EXTREMITY: ICD-10-CM

## 2024-12-09 DIAGNOSIS — I89.0 LYMPHEDEMA OF BOTH LOWER EXTREMITIES: Primary | ICD-10-CM

## 2024-12-09 DIAGNOSIS — R60.9 LIPEDEMA: ICD-10-CM

## 2024-12-09 PROCEDURE — 97140 MANUAL THERAPY 1/> REGIONS: CPT

## 2024-12-09 PROCEDURE — 97016 VASOPNEUMATIC DEVICE THERAPY: CPT

## 2024-12-09 NOTE — PROGRESS NOTES
Physical Therapy Daily Treatment Note     Name: Khushbu Davis  Clinic Number: [id number]    Therapy Diagnosis:   Encounter Diagnoses   Name Primary?    Lymphedema of both lower extremities Yes    Swelling of lower extremity     Lipedema      Physician: Hoang Roper MD    Visit Date: 12/9/2024    Physician Orders: PT Eval and Treat - lymphedema  Medical Diagnosis from Referral:   Updated referral 11/5/24 per Dr. Roper for B UE / B LE     Diagnosis Information    Diagnosis   R60.9 (ICD-10-CM) - Lipedema   I89.0 (ICD-10-CM) - Lymphedema     Diagnosis   I89.0 (ICD-10-CM) - Lymphedema, not elsewhere classified   M79.89 (ICD-10-CM) - Other specified soft tissue disorders      Free Text Diagnosis   I89.0 (ICD-10-CM) - Lymphedema M79.89 (ICD-10-CM) - Leg swelling   Evaluation Date: 9/30/2024  Authorization Period Expiration: 12/31/24  Plan of Care Expiration: 12/24/24  Visit # / Visits authorized: 10/20     Time In: 300p  Time Out: 400p  Total Billable Time: 60 minutes  Pump for B LE     Precautions: Standard    Subjective     Pt reports: she contacted [employer] with her new arm sleeve orders- awaiting call back.   Dr. Roper is following for both UE and LE, he suggested home pump system if she can afford copayments.   She is investigating lipedema lipos[place].   She was compliant with home compression/exercise program.  Response to previous treatment: compression support for B LE with TH garments, using temporary tubigrip segments L UE.  Her shoulder has some discomfort at times but is improved since injection.   Feels therapy has been beneficial, advised moving to Phase II of self care this month.    Functional change: amb no device, working  Pain:  2/10 L UE  Location: bilateral BLE mostly tender to touch, tenderness to L UE > R UE  Admits feels more symptoms with cold weather.    Objective   Female amb to dept no device, wearing Sigvaris TH 20-30mmHg B LE,  "tennis shoes, tubigrip to L UE  Amount of Swelling/Location of Swelling: mild/mod soft fullness to B LE ankles, lower legs, triangular soft fullness thighs   Soft tissue fullness and general tenderness to B UE L > R - no pitting, no density- bandaid L sh post injection, small bruise L thumb post infection  Skin Integrity: fatty distributions prox leg, ankles, thighs   Palpation/Texture: soft fullness, density, no pitting, tender to deep touch, broken veins   - B Stemmer Sign  - B Neo's Sign  Circulation: intact      L UE propped supine + table/pillows for neck and shoulder comfort      Girth Measurements (in centimeters)  LANDMARK RIGHT UE  11/6/24 LEFT UE  11/6/24 L UE  12/9/24 Change  L UE   E + 6" 36.0 cm 37.0 cm 37.0 0   E + 4" 36.5 cm 36.0 cm 36.0 0   E + 2" 32.5 cm 33.0 cm 32.0 1.0   Elbow 28.5 cm 28.0 cm 27.5 0.5   W+ 8" 29.0 cm 28.5 cm 28.0 0.5   W +  6" 28.0 cm 27.0 cm 27.5 0.5   W + 4" 24.5 cm 23.5 cm 23.0 0.5   Wrist 17.5 cm 17.0 cm 16.5 0.5   DPC 19.5 cm 19.0 cm 18.0 1.0   IP Thumb 6.3 cm 6.5 cm 6.2 0.3     Treatment:   Khushbu received the following manual therapy techniques:- Manual Lymphatic Drainage were applied to the: L UE  for 45 minutes, including: MLD and compression   Girth assessment    MANUAL LYMPHATIC DRAINAGE:  Drainage of L upper quadrant and L UE   While supine with arm elevated,  Drainage along neck, B subclavian regions,  Across ant chest and top of shoulder,  Axillary regions,  drainage of entire UE especially upper arm, forearm, wrist and hand with return of all areas proximally,     MULTILAYERED BANDAGING:  not performed  Pt has tubigrip segments E for lower arm and G for upper arm - advised basic compression as temporary support daytime and options for arm compression sleeves - therapy discussed 15-20mmHg or lite lipedema styles for UE  Compression orders per Dr. Roper - pt has copy and DME list  Pt able to self reapply TH garments to B LE- daily wear     SEQUENTIAL COMPRESSION " PUMP: full leg sleeve applied to B LE while  simultaneous to Manual Lymphatic Drainage, MLD,  of L UE.  VES 5200 with default setting with distal pressures starting at 45mmHg entire LE   Simultaneous to compression supplies, compression education and training, and self management.    Khushbu received therapeutic exercises to develop strength, endurance, ROM, flexibility, and posture  Verbal review  postural correction, alignment, avoid irritation or pain with motions  Shoulder body mechanics and muscle activation.   Upper body and spine postural correction  Sh arom, painfree or limited ROM  Scapular retractions  Scaption to 90 holds  aps, walking, avoid dependency and immobility, support of muscle pump with compression and activity.  THERAPEUTIC EXERCISES:  Continue HEP of AROM, stretching, and postural correction.     Home Exercises Provided and Patient Education Provided:  Self Care Home Management Training/Functional Therapeutic Activity x 5 minutes  Sleeve suggestions L UE and consider R UE as needed- 15-20mmHg or 18-21mmHg lipedema class  MD orders UE compression sleeve 12/4/24  products, DME support, Insurance coverage support, and self pay options.  Pt using [employer] for new sleeve    Lipo Lymphedema vs Lymphedema vs Lipedema-product selection, treatment components, and consults  Suggest UE 15-20mmHg or lessor class, medical grade garments or considerations for Bioflect or Solidea Lipedema styles/class,  advised on DME support with medical brands like Juzo, Jobst, Sigvaris, etc.   Compression, mobility and goals of management regarding lipo lymphedema  including compression, exercise, self massage   Compression choices in style, class and product - suggestions for Bioflect style support of Bolero for B UE or micro massage weave of product  .   Pump consult status:  Home pump considerations - MD recommends proceed if finances allow   Reports deductible with $400-$800 her responsibility - pump use  and goals of treatment: B LE leg sleeves / pump unit   insurance and vendor consulted by Dr. Roper.     B  LE  leg swelling, Lymphedema,and updated orders/referral to treat UE.  Present compression:  Sigvaris TH 20-30mmHg x 3  compression choices, reviewed class, style of product and sizing selection if choosing to self order.   Options for Bioflect or Solidea - leggings tights  Consideration for leggings, pantyhose, B UE sleeves or Bolero styling.   Orders and recommendations: daily compression,  TH, KH + tights, leggings, Dr. Roper -pump consult to [facility],  Therapy forwarded supporting documentation 10/2/24.- pt choice pending   UE compression - will need new orders  PATIENT/FAMILY Education: bandaging/compression wear schedule,  HEP,  Beginning of self massage,  Self or assisted bandaging, compression options, and Risk reduction    Written Home Exercises Provided: Patient instructed to cont prior HEP.  Home exercise and compression plan of management was reviewed and Khushbu was able to demonstrate understanding prior to the end of the session.  Khushbu demonstrated good  understanding of the education provided.     Assessment   Khushbu DONATO is a 64 y.o. female referred to outpatient Physical Therapy with a medical diagnosis of   Diagnosis   I89.0 (ICD-10-CM) - Lymphedema, not elsewhere classified   M79.89 (ICD-10-CM) - Other specified soft tissue disorders      Free Text Diagnosis   I89.0 (ICD-10-CM) - Lymphedema M79.89 (ICD-10-CM) - Leg swelling   This patient presents s/p LE pain and edema with lipedema characteristics resulting in: mild lymphedema of the B LE, increased pain, increased stiffness in the soft tissues and tenderness, size/shape changes, bruising, broken veins, as well as difficulty performing weight loss/management , compression needs, and placing the pt at higher risk of infection. Will initiate Complete Decongestive Therapy, CDT, to assist in symptom relief and management.   Referral was added  for lipo lipedema or considerations for L UE symptom management with Complete Decongestive Therapy, CDT.  Performing drainage massage, lite compression, and exercise to address lymphatic mobility of L UE but will also need further orthopedic assessment.   B LE receiving compression and pump support for Lipo Lymphedema B LE and recommendations for self care plan.    Less pain and less tenderness to L UE although pt reporting continued sensitivity to pressure, lipedema changes.  L UE with several areas of 0.5cm reductions throughout and new compression orders for arm sleeve pending.  Advised on Lipedema and Lipo Lipedema management with Phase I of active therapy to shift to Phase II of self care management.      Khushbu Is progressing well towards her goals.   Pt prognosis is Good.     Pt will continue to benefit from skilled outpatient physical therapy to address the deficits listed in the problem list box on initial evaluation, provide pt/family education and to maximize pt's level of independence in the home and community environment.     Pt's spiritual, cultural and educational needs considered and pt agreeable to plan of care and goals.    Anticipated Barriers for therapy: cost of copay, shoulder/neck PT needs     The following goals were discussed with the patient and patient is in agreement with them as to be addressed in the treatment plan.     UE goals x 4 weeks:  1. Patient to show decreased girth in L UE by up to 1cm to allow for improved UE symmetry, clothing choice, ROM, and UE function.  (progressing  2. Patient will show reduction in density to mild or less with improved contour of limb to allow for improved cosmesis, improved lymphatic drainage, and functional mobility.   met)  3. Patient to ashley/doff compression garment with daily compliance to support lymphatic mobility and skin elasticity.  (progressing, orders with DME  4. Pt to show improved postural awareness and alignmentt.  (progressing  5. Pt to  be I and compliant with HEP to allow for improved ROM to allow reach and carry with functional endurance and strength.    (progressing,ongoing     Short Term Goals: (6 weeks)  1. Patient will show decreased girth in B LE by up to 1 cm to allow for LE symmetry, shoe and clothing choice, and ability to apply needed compression.  (met)   2. Patient will demonstrate 100% knowledge of lymphedema precautions and signs of infection to allow for reduced lymphedema risk, infection risk, and/or exacerbation of condition.   met)  3. Patient or caregiver will perform self-bandaging techniques and/or wearing of compression garments to allow for lymphatic drainage support, skin elasticity, and reduction in shape and size of limb.  met)  4. Patient will perform self lymph drainage techniques to areas within reach to enhance lymphatic drainage and skin condition.  (progressing,  5. Patient will tolerate daily activities with multilayered bandaging to allow for lymphatic and venous support.  met)     Long Term Goals: (12  weeks)  1. Patient will show decreased girth in B LE by up to 1 cm  to allow for LE symmetry, shoe and clothing choice, and ability to apply needed compression daily.  met)  2. Patient will show reduction in density to mild or less with improved contour of limb to allow for cosmesis, LE symmetry, infection risk reduction, and clothing and compression choice.  met)  3. Patient to ashley/doff compression garment with daily compliance to assist in lymphedema management, skin elasticity, and tissue density. met)  4. Pt to show improved postural awareness and alignment.  (progressing,  5. Pt to be I and compliant with HEP to allow for increased function in affected limb.   (progressing,ongoing     Goals to address pain and symptomatic management of B UE. (6 weeks)  1. Patient to show decreased girth in B UE by up to 1cm to allow for improved UE symmetry, clothing choice, ROM, and UE function.  (progressing, not met)  2.  Patient will show reduction in density to mild or less with improved contour of limb to allow for improved cosmesis, improved lymphatic drainage, and functional mobility.  (progressing, not met)  3. Patient to ashley/doff compression garment with daily compliance to support lymphatic mobility and skin elasticity.  (progressing, not met)  4. Pt to show improved postural awareness and alignment.  (progressing, not met)  5. Pt to be I and compliant with HEP to allow for improved ROM, reach and carry with functional endurance and strength.    (progressing, not met)  Plan   Plan of care Certification: 9/30/2024 to 12/24/24.     Outpatient Physical Therapy 1 times weekly for 10 weeks to include the following interventions: Patient Education, Self Care, Therapeutic Activities, and Therapeutic Exercise. Complete Decongestive Therapy- compression and home equipment needs to be addressed and assisted.    Focus to shift to UE, continue sequential compression pump and compression garments for B LE.   1 x weekly L UE + vascular and orthopedics following.     Essence Cobos, PT

## 2024-12-13 ENCOUNTER — TELEPHONE (OUTPATIENT)
Dept: VASCULAR SURGERY | Facility: CLINIC | Age: 64
End: 2024-12-13
Payer: COMMERCIAL

## 2024-12-13 NOTE — TELEPHONE ENCOUNTER
----- Message from Tech Cristal sent at 12/13/2024 10:12 AM CST -----  Regarding: Patient call back  .Type: Patient Call Back    Who called:self     What is the request in detail:caller states an order was put in for a sleeve for her arm. States she is trying to get the sleeve but measurements were not put on the order. Asking to fax order with measurements to fax #[phone]     Can the clinic reply by [facility]?no    Would the patient rather a call back or a response via [facility]? Call     Best call back number:.[phone]      Additional Information:

## 2024-12-17 DIAGNOSIS — R60.9 LIPEDEMA: ICD-10-CM

## 2024-12-17 DIAGNOSIS — I89.0 LYMPHEDEMA: Primary | ICD-10-CM

## 2024-12-18 ENCOUNTER — CLINICAL SUPPORT (OUTPATIENT)
Dept: REHABILITATION | Facility: HOSPITAL | Age: 64
End: 2024-12-18
Payer: COMMERCIAL

## 2024-12-18 DIAGNOSIS — R60.9 LIPEDEMA: ICD-10-CM

## 2024-12-18 DIAGNOSIS — M79.89 SWELLING OF LOWER EXTREMITY: ICD-10-CM

## 2024-12-18 DIAGNOSIS — I89.0 LYMPHEDEMA OF BOTH LOWER EXTREMITIES: Primary | ICD-10-CM

## 2024-12-18 PROCEDURE — 97140 MANUAL THERAPY 1/> REGIONS: CPT

## 2024-12-18 PROCEDURE — 97016 VASOPNEUMATIC DEVICE THERAPY: CPT

## 2024-12-18 NOTE — PROGRESS NOTES
Physical Therapy Daily Treatment Note     Name: Khushbu Davis  Clinic Number: [id number]    Therapy Diagnosis:   Encounter Diagnoses   Name Primary?    Lymphedema of both lower extremities Yes    Swelling of lower extremity     Lipedema      Physician: Hoang Roper MD    Visit Date: 12/18/2024    Physician Orders: PT Eval and Treat - lymphedema  Medical Diagnosis from Referral:   Updated referral 11/5/24 per Dr. Roper for B UE / B LE     Diagnosis Information    Diagnosis   R60.9 (ICD-10-CM) - Lipedema   I89.0 (ICD-10-CM) - Lymphedema     Diagnosis   I89.0 (ICD-10-CM) - Lymphedema, not elsewhere classified   M79.89 (ICD-10-CM) - Other specified soft tissue disorders      Free Text Diagnosis   I89.0 (ICD-10-CM) - Lymphedema M79.89 (ICD-10-CM) - Leg swelling   Evaluation Date: 9/30/2024  Authorization Period Expiration: 12/31/24  Plan of Care Expiration: 12/24/24  Visit # / Visits authorized: 11/20     Time In: 300p  Time Out: 400p  Total Billable Time: 60 minutes  Pump for B LE     Precautions: Standard    Subjective     Pt reports: she visited [facility] orders for compression arm sleeve - they will verify class/strength with her doctor.    She was compliant with home compression/exercise program.  Response to previous treatment: she is proceeding with home compression pump system for  BLE with expected high copayment due to deductible not being met.  Pt feels benefits from therapy, pump and compression support. Pt understands management.      Functional change: amb no device, working, tired with busy day.   Pain:  2/10 L UE  Location: bilateral BLE mostly tender to touch, tenderness to L UE > R UE  Admits feels more symptoms with cold weather.    Objective   Female amb to dept no device, wearing Sigvaris TH 20-30mmHg B LE, tennis shoes, tubigrip to L UE  Amount of Swelling/Location of Swelling: mild/mod soft fullness to B LE ankles, lower legs, triangular soft fullness thighs   Soft  "tissue fullness and general tenderness to B UE L > R - no pitting, no density- bandaid L sh post injection, small bruise L thumb post infection  Skin Integrity: fatty distributions prox leg, ankles, thighs   Palpation/Texture: soft fullness, density, no pitting, tender to deep touch, broken veins   - B Stemmer Sign  - B Neo's Sign  Circulation: intact      L UE propped supine + table/pillows for neck and shoulder comfort      Girth Measurements (in centimeters)  LANDMARK RIGHT UE  11/6/24 LEFT UE  11/6/24 L UE  12/9/24 Change  L UE   E + 6" 36.0 cm 37.0 cm 37.0 0   E + 4" 36.5 cm 36.0 cm 36.0 0   E + 2" 32.5 cm 33.0 cm 32.0 1.0   Elbow 28.5 cm 28.0 cm 27.5 0.5   W+ 8" 29.0 cm 28.5 cm 28.0 0.5   W +  6" 28.0 cm 27.0 cm 27.5 0.5   W + 4" 24.5 cm 23.5 cm 23.0 0.5   Wrist 17.5 cm 17.0 cm 16.5 0.5   DPC 19.5 cm 19.0 cm 18.0 1.0   IP Thumb 6.3 cm 6.5 cm 6.2 0.3     Treatment:   Khushbu received the following manual therapy techniques:- Manual Lymphatic Drainage were applied to the: L UE  for 40 minutes, including: MLD and compression     MANUAL LYMPHATIC DRAINAGE:  Drainage of L upper quadrant and L UE   While supine with arm elevated,  Drainage along neck, B subclavian regions,  Across ant chest and top of shoulder,  Axillary regions,  drainage of entire UE especially upper arm, forearm, wrist and hand with return of all areas proximally,     MULTILAYERED BANDAGING:  not performed  Orders for UE compression sleeve with Dr. Roper - she is discussing 15-20mmHg vs 20-30mmHg with vendor.   Using tubigrip segments E for lower arm and G for upper arm - advised basic compression as temporary support daytime and options for arm compression sleeves   Reviewed use of lite lipedema styles for UE  Compression orders per Dr. Roper - pt has copy and DME list  Pt able to self reapply TH garments to B LE- daily wear     SEQUENTIAL COMPRESSION PUMP: full leg sleeve applied to B LE while  simultaneous to Manual Lymphatic Drainage, " MLD,  of L UE.  VES 5200 with default setting with distal pressures starting at 45mmHg entire LE   Simultaneous to compression supplies, compression education and training, and self management.    Khushbu received therapeutic exercises to develop strength, endurance, ROM, flexibility, and posture  Verbal review  postural correction, alignment, avoid irritation or pain with motions  Shoulder body mechanics and muscle activation.   Upper body and spine postural correction  Sh arom, painfree or limited ROM  Scapular retractions  Scaption to 90 holds  aps, walking, avoid dependency and immobility, support of muscle pump with compression and activity.  THERAPEUTIC EXERCISES:  Continue HEP of AROM, stretching, and postural correction.     Home Exercises Provided and Patient Education Provided:  Self Care Home Management Training/Functional Therapeutic Activity x 5 minutes  UE compression sleeve suggestions L UE and consider R UE as needed- 15-20mmHg or 18-21mmHg lipedema class vs 20-30mmHg  B LE continue with 20-30mmHg TH garments  Reviewed lipo lymphedema, lymphedema and lipedema compression options     MD orders UE compression sleeve 12/4/24  products, DME support, Insurance coverage support, and self pay options.  using [employer] for new sleeve    Lipo Lymphedema vs Lymphedema vs Lipedema-product selection, treatment components, and consults UE and LE recommendations  Suggest UE 15-20mmHg or lessor class, medical grade garments or considerations for Bioflect or Solidea Lipedema styles/class,  advised on DME support with medical brands like Juzo, Jobst, Sigvaris, etc.   Compression, mobility and goals of management regarding lipo lymphedema  including compression, exercise, self massage   Compression choices in style, class and product - suggestions / options to consider Bioflect style support of Bolero for B UE or micro massage weave of product  .   Pump consult status:  Home pump considerations - MD  recommends proceed if finances allow   Pt choose to proceed and ordered pump with [employer].   deductible $400-$800 her responsibility - pump use and goals of treatment: B LE leg sleeves / pump unit   insurance and vendor consulted by Dr. Roper.     B  LE  leg swelling, Lymphedema,and updated orders/referral to treat UE.  Present compression:  Sigvaris TH 20-30mmHg x 3  compression choices, reviewed class, style of product and sizing selection if choosing to self order.   Options for Bioflect or Solidea - leggings tights  Consideration for leggings, pantyhose, B UE sleeves or Bolero styling.   Orders and recommendations: daily compression,  TH, KH + tights, leggings, Dr. Roper -pump consult to [employer],  Therapy forwarded supporting documentation 10/2/24.- pt choice pending   UE compression - will need new orders  PATIENT/FAMILY Education: bandaging/compression wear schedule,  HEP,  Beginning of self massage,  Self or assisted bandaging, compression options, and Risk reduction    Written Home Exercises Provided: Patient instructed to cont prior HEP.  Home exercise and compression plan of management was reviewed and Khushbu was able to demonstrate understanding prior to the end of the session.  Khushbu demonstrated good  understanding of the education provided.     Assessment   Khushbu DONATO is a 64 y.o. female referred to outpatient Physical Therapy with a medical diagnosis of   Diagnosis   I89.0 (ICD-10-CM) - Lymphedema, not elsewhere classified   M79.89 (ICD-10-CM) - Other specified soft tissue disorders      Free Text Diagnosis   I89.0 (ICD-10-CM) - Lymphedema M79.89 (ICD-10-CM) - Leg swelling   This patient presents s/p LE pain and edema with lipedema characteristics resulting in: mild lymphedema of the B LE, increased pain, increased stiffness in the soft tissues and tenderness, size/shape changes, bruising, broken veins, as well as difficulty performing weight loss/management , compression needs, and placing the pt  at higher risk of infection. Complete Decongestive Therapy, CDT, to assist in symptom relief and management.   added for lipo lipedema considerations for L UE symptom management with Complete Decongestive Therapy, CDT.  Performing drainage massage, lite compression, and exercise to address lymphatic mobility of L UE, may require shoulder orthopedic assessment.   B LE receiving compression and pump support for Lipo Lymphedema B LE and recommendations for self care plan.    Addressing lipo lymphedema B LE and lipedema symptomatic issues of L UE > R UE.  Pt has remained compliant with compression support for B LE and will proceed to home pump consult with required deductible cost.  Pt has L UE compression sleeve pending.  Less symptomatic complaints and progressing to her self care phase of management.   Lipedema and Lipo Lipedema management with Phase I of active therapy to shift to Phase II of self care management.      Khushbu Is progressing well towards her goals.   Pt prognosis is Good.     Pt will continue to benefit from skilled outpatient physical therapy to address the deficits listed in the problem list box on initial evaluation, provide pt/family education and to maximize pt's level of independence in the home and community environment.     Pt's spiritual, cultural and educational needs considered and pt agreeable to plan of care and goals.    Anticipated Barriers for therapy: cost of copay, shoulder/neck PT needs     The following goals were discussed with the patient and patient is in agreement with them as to be addressed in the treatment plan.     UE goals x 4 weeks:  1. Patient to show decreased girth in L UE by up to 1cm to allow for improved UE symmetry, clothing choice, ROM, and UE function.  (progressing  2. Patient will show reduction in density to mild or less with improved contour of limb to allow for improved cosmesis, improved lymphatic drainage, and functional mobility.   met)  3. Patient to  ashley/doff compression garment with daily compliance to support lymphatic mobility and skin elasticity.  (progressing, orders with DME  4. Pt to show improved postural awareness and alignmentt.  (progressing  5. Pt to be I and compliant with HEP to allow for improved ROM to allow reach and carry with functional endurance and strength.    (progressing,ongoing     Short Term Goals: (6 weeks)  1. Patient will show decreased girth in B LE by up to 1 cm to allow for LE symmetry, shoe and clothing choice, and ability to apply needed compression.  (met)   2. Patient will demonstrate 100% knowledge of lymphedema precautions and signs of infection to allow for reduced lymphedema risk, infection risk, and/or exacerbation of condition.   met)  3. Patient or caregiver will perform self-bandaging techniques and/or wearing of compression garments to allow for lymphatic drainage support, skin elasticity, and reduction in shape and size of limb.  met)  4. Patient will perform self lymph drainage techniques to areas within reach to enhance lymphatic drainage and skin condition.  (progressing,  5. Patient will tolerate daily activities with multilayered bandaging to allow for lymphatic and venous support.  met)     Long Term Goals: (12  weeks)  1. Patient will show decreased girth in B LE by up to 1 cm  to allow for LE symmetry, shoe and clothing choice, and ability to apply needed compression daily.  met)  2. Patient will show reduction in density to mild or less with improved contour of limb to allow for cosmesis, LE symmetry, infection risk reduction, and clothing and compression choice.  met)  3. Patient to ashley/doff compression garment with daily compliance to assist in lymphedema management, skin elasticity, and tissue density. met)  4. Pt to show improved postural awareness and alignment.  (progressing,  5. Pt to be I and compliant with HEP to allow for increased function in affected limb.   (progressing,ongoing     Goals to  address pain and symptomatic management of B UE. (6 weeks)  1. Patient to show decreased girth in B UE by up to 1cm to allow for improved UE symmetry, clothing choice, ROM, and UE function. met)  2. Patient will show reduction in density to mild or less with improved contour of limb to allow for improved cosmesis, improved lymphatic drainage, and functional mobility.  met)  3. Patient to ashley/doff compression garment with daily compliance to support lymphatic mobility and skin elasticity.  (met)  4. Pt to show improved postural awareness and alignment.  (progressing,  5. Pt to be I and compliant with HEP to allow for improved ROM, reach and carry with functional endurance and strength.    (progressing, ongoing  Plan   Plan of care Certification: 9/30/2024 to 12/24/24.     Outpatient Physical Therapy 1 times weekly for 10 weeks to include the following interventions: Patient Education, Self Care, Therapeutic Activities, and Therapeutic Exercise. Complete Decongestive Therapy- compression and home equipment needs to be addressed and assisted.    Focus to shift to UE, continue sequential compression pump and compression garments for B LE.   1 x weekly L UE + vascular and orthopedics following.     Essence Cobos, PT

## 2024-12-23 ENCOUNTER — CLINICAL SUPPORT (OUTPATIENT)
Dept: REHABILITATION | Facility: HOSPITAL | Age: 64
End: 2024-12-23
Payer: COMMERCIAL

## 2024-12-23 DIAGNOSIS — R60.9 LIPEDEMA: ICD-10-CM

## 2024-12-23 DIAGNOSIS — I89.0 LYMPHEDEMA OF BOTH LOWER EXTREMITIES: Primary | ICD-10-CM

## 2024-12-23 DIAGNOSIS — M79.89 SWELLING OF LOWER EXTREMITY: ICD-10-CM

## 2024-12-23 PROCEDURE — 97535 SELF CARE MNGMENT TRAINING: CPT

## 2024-12-23 PROCEDURE — 97016 VASOPNEUMATIC DEVICE THERAPY: CPT

## 2024-12-23 PROCEDURE — 97140 MANUAL THERAPY 1/> REGIONS: CPT

## 2024-12-23 NOTE — PROGRESS NOTES
Physical Therapy Daily Treatment Note/ Discharge Summary      Name: Khushbu Davis  Clinic Number: [id number]    Therapy Diagnosis:   Encounter Diagnoses   Name Primary?    Lymphedema of both lower extremities Yes    Swelling of lower extremity     Lipedema      Physician: Hoang Roper MD    Visit Date: 12/23/2024    Physician Orders: PT Eval and Treat - lymphedema  Medical Diagnosis from Referral:   Updated referral 11/5/24 per Dr. Roper for B UE / B LE     Diagnosis Information    Diagnosis   R60.9 (ICD-10-CM) - Lipedema   I89.0 (ICD-10-CM) - Lymphedema     Diagnosis   I89.0 (ICD-10-CM) - Lymphedema, not elsewhere classified   M79.89 (ICD-10-CM) - Other specified soft tissue disorders      Free Text Diagnosis   I89.0 (ICD-10-CM) - Lymphedema M79.89 (ICD-10-CM) - Leg swelling   Evaluation Date: 9/30/2024  Authorization Period Expiration: 12/31/24  Plan of Care Expiration: 12/24/24  Visit # / Visits authorized: 12/20    Date of Last visit: 12/23/24  Total Visits Received: 12     Time In: 300p  Time Out: 400p  Total Billable Time: 60 minutes  Pump for B LE     Precautions: Standard    Subjective     Pt reports: she needs to confirm class and return to [facility] for her new compression arm sleeve.  Pt did provide deductible payment to vendor - now awaiting delivery of home pump systems.     She was compliant with home compression/exercise program.  Response to previous treatment: wears TH daily for B LE support, awaiting compression sleeve L UE.  Shoulder has been doing well since last injection - pain relief.  Pt understands plan of management and discharge goals.   She has consulted with outside medical groups regarding lipedema procedures.      Functional change: amb no device, working, tired with busy days.   Pain:  2/10 L UE  Location: bilateral BLE mostly tender to touch, tenderness to L UE > R UE  Admits feels more symptoms with cold weather.    Objective   Female amb to dept no device,  "wearing Sigvaris TH 20-30mmHg B LE, tennis shoes, tubigrip to L UE  Amount of Swelling/Location of Swelling: mild/mod soft fullness to B LE ankles, lower legs, triangular soft fullness thighs   Soft tissue fullness and general tenderness to B UE L > R - no pitting, no density- bandaid L sh post injection, small bruise L thumb post infection  Skin Integrity: fatty distributions prox leg, ankles, thighs   Palpation/Texture: soft fullness, density, no pitting, tender to deep touch, broken veins   - B Stemmer Sign  - B Neo's Sign  Circulation: intact      L UE propped supine + table/pillows for neck and shoulder comfort     UE shoulder flexion AROM 155 - only mild pulling     Girth Measurements (in centimeters)  LANDMARK LEFT LE  9/30/24 L LE  11/6/24 LLE  12/23/24 Change  L RIGHT LE  9/30/24 R LE  11/6/24 R LE  12/23/24 Change  R DIFF   at eval   SBP + 10  58.0 cm 58.0 59.0 1.0 58.0 cm 57.0 58.0 0 0 cm   SBP 49.0 cm 48.5 49.0 0 49.5 cm 47.0 47.0 2.5 0.5 cm   10 below SBP 43.0 cm 40.0 41.0 2.0 43.0 cm 41.0 42.0 1.0 0 cm   20 below SBP 41.0 cm 39.5 39.0 2.0 39.0 cm 39.0 37.5 1.5 2.0 cm   30 below SBP 32.5 cm 30.5 30.0 2.5 31.0 cm 31.0 29.0 2.0 1.5 cm   35 below SBP 28.0 cm 26.5 25.5 2.5 26.5 cm 26.5 25.0 1.5 1.5 cm   Ankle 26.0 cm 25.0 24.0 2.0 26.5 cm 25.5 25.0 1.5 0.5 cm   Forefoot 21.5 cm 20.5 20.5 1.0 21.0 cm 20.5 20.0 1.0 0.5 cm      Girth Measurements (in centimeters)  LANDMARK RIGHT UE  11/6/24 LEFT UE  11/6/24 L UE  12/9/24 L UE  12/23/24 Change  L UE   E + 6" 36.0 cm 37.0 cm 37.0 37.0 0   E + 4" 36.5 cm 36.0 cm 36.0 35.5 0.5   E + 2" 32.5 cm 33.0 cm 32.0 32.0 1.0   Elbow 28.5 cm 28.0 cm 27.5 27.5 0.5   W+ 8" 29.0 cm 28.5 cm 28.0 27.5 0.5   W +  6" 28.0 cm 27.0 cm 27.5 26.0 1.0   W + 4" 24.5 cm 23.5 cm 23.0 22.5 1.0   Wrist 17.5 cm 17.0 cm 16.5 16.0 1.0   DPC 19.5 cm 19.0 cm 18.0 18.5 0.5   IP Thumb 6.3 cm 6.5 cm 6.2 6.2 0.3     Treatment:   Khushbu received the following manual therapy techniques:- Manual " Lymphatic Drainage were applied to the: L UE  for 45 minutes, including: MLD and compression   Girth assessments     MANUAL LYMPHATIC DRAINAGE:  Drainage of L upper quadrant and L UE   While supine with arm elevated,  Drainage along neck, B subclavian regions,  Across ant chest and top of shoulder,  Axillary regions,  drainage of entire UE especially upper arm, forearm, wrist and hand with return of all areas proximally,     MULTILAYERED BANDAGING:  not performed  Orders for UE compression sleeve with Dr. Roper - she is discussing 15-20mmHg vs 20-30mmHg with vendor.   Using tubigrip segments E for lower arm and G for upper arm - advised basic compression as temporary support daytime and options for arm compression sleeves   Reviewed use of lite lipedema styles for UE  Compression orders per Dr. Roper - pt has copy and DME list  Pt able to self reapply TH garments to B LE- daily wear     SEQUENTIAL COMPRESSION PUMP: full leg sleeve applied to B LE while  simultaneous to Manual Lymphatic Drainage, MLD,  of L UE.  VES 5200 with default setting with distal pressures starting at 45mmHg entire LE   Simultaneous to compression supplies, compression education and training, and self management.    Khushbu received therapeutic exercises to develop strength, endurance, ROM, flexibility, and posture  Verbal review  postural correction, alignment, avoid irritation or pain with motions  Shoulder body mechanics and muscle activation.   Upper body and spine postural correction  Sh arom, painfree or limited ROM  Scapular retractions  Scaption to 90 holds  aps, walking, avoid dependency and immobility, support of muscle pump with compression and activity.  THERAPEUTIC EXERCISES:  Continue HEP of AROM, stretching, and postural correction.     Home Exercises Provided and Patient Education Provided:  Self Care Home Management Training/Functional Therapeutic Activity x 15 minutes  UE compression sleeve suggestions L UE - 15-20mmHg or  18-21mmHg lipedema class vs 20-30mmHg  B LE continue with 20-30mmHg TH garments  Reviewed lipo lymphedema, lymphedema and lipedema compression options     MD orders UE compression sleeve 12/4/24  products, DME support, Insurance coverage support, and self pay options.  using [employer] for new sleeve    Lipo Lymphedema vs Lymphedema vs Lipedema-product selection, treatment components, and consults UE and LE recommendations  Suggest UE 15-20mmHg or lessor class, medical grade garments or considerations for Bioflect or Solidea Lipedema styles/class,  advised on DME support with medical brands like Juzo, Jobst, Sigvaris, etc.   Compression, mobility and goals of management regarding lipo lymphedema  including compression, exercise, self massage   Compression choices in style, class and product - suggestions / options to consider Bioflect style support of Bolero for B UE or micro massage weave of product  Advised if seeking procedures for Lipedema to confirm all protocols, compression needs, and pros/cons of procedures.   .   Pump consult status:  Home pump considerations - MD recommends proceed if finances allow   Pt choose to proceed and ordered pump with [facility].   deductible $400-$800 her responsibility - pump use and goals of treatment: B LE leg sleeves / pump unit   insurance and vendor consulted by Dr. Roper.     B  LE  leg swelling, Lymphedema,and updated orders/referral to treat UE.  Present compression:  Sigvaris TH 20-30mmHg x 3 pairs  compression choices, reviewed class, style of product and sizing selection if choosing to self order.   Options for Bioflect or Solidea - leggings tights  Consideration for leggings, pantyhose, B UE sleeves or Bolero styling.   Orders and recommendations: daily compression,  TH, KH + tights, leggings, Dr. Roper -pump consult to [facility],  Therapy forwarded supporting documentation 10/2/24.- pt choice pending   UE compression - will need new  orders  PATIENT/FAMILY Education: bandaging/compression wear schedule,  HEP,  Beginning of self massage,  Self or assisted bandaging, compression options, and Risk reduction    Written Home Exercises Provided: Patient instructed to cont prior HEP.  Home exercise and compression plan of management was reviewed and Khushbu was able to demonstrate understanding prior to the end of the session.  Khushbu demonstrated good  understanding of the education provided.     Assessment   Khushbu DONATO is a 64 y.o. female referred to outpatient Physical Therapy with a medical diagnosis of   Diagnosis   I89.0 (ICD-10-CM) - Lymphedema, not elsewhere classified   M79.89 (ICD-10-CM) - Other specified soft tissue disorders      Free Text Diagnosis   I89.0 (ICD-10-CM) - Lymphedema M79.89 (ICD-10-CM) - Leg swelling   This patient presents s/p LE pain and edema with lipedema characteristics resulting in: mild lymphedema of the B LE, increased pain, increased stiffness in the soft tissues and tenderness, size/shape changes, bruising, broken veins, as well as difficulty performing weight loss/management , compression needs, and placing the pt at higher risk of infection. Complete Decongestive Therapy, CDT, to assist in symptom relief and management.   added for lipo lipedema considerations for L UE symptom management with Complete Decongestive Therapy, CDT.  Performing drainage massage, lite compression, and exercise to address lymphatic mobility of L UE, may require shoulder orthopedic assessment.   B LE receiving compression and pump support for Lipo Lymphedema B LE and recommendations for self care plan.    Pt has completed this course of management per Dr. Roper's referral for  BLE lipo lymphedema B LE and lipedema symptomatic issues of L UE > R UE.    [facility] awaiting home pump system delivery for B LE management, and  has orders for lite compression sleeve L UE.  Pt admits less shoulder pain and overall  arm pain - likely improved since injection with orthopedics although still reporting tenderness and touch sensitivity typical of her lipedema symptoms mostly with BP cuff L UE. Support of these conditions with compression, tissue mobility, exercise and weight management support.  Pt is being discharged to her self management plans.   Khushbu Is progressing well towards her goals.   Pt prognosis is Good.     Pt's spiritual, cultural and educational needs considered and pt agreeable to plan of care and goals.    Anticipated Barriers for therapy: cost of copay, shoulder/neck PT needs     The following goals were discussed with the patient and patient is in agreement with them as to be addressed in the treatment plan.     UE goals x 4- 6 weeks:  1. Patient to show decreased girth in L UE by up to 1cm to allow for improved UE symmetry, clothing choice, ROM, and UE function.   met  2. Patient will show reduction in density to mild or less with improved contour of limb to allow for improved cosmesis, improved lymphatic drainage, and functional mobility.   met)  3. Patient to ashley/doff compression garment with daily compliance to support lymphatic mobility and skin elasticity.  (progressing, orders with DME  4. Pt to show improved postural awareness and alignmentt.  (progressing  5. Pt to be I and compliant with HEP to allow for improved ROM to allow reach and carry with functional endurance and strength.    (progressing,ongoing     Short Term Goals: (6 weeks)  1. Patient will show decreased girth in B LE by up to 1 cm to allow for LE symmetry, shoe and clothing choice, and ability to apply needed compression.  (met)   2. Patient will demonstrate 100% knowledge of lymphedema precautions and signs of infection to allow for reduced lymphedema risk, infection risk, and/or exacerbation of condition.   met)  3. Patient or caregiver will perform self-bandaging techniques and/or wearing of compression garments to allow for lymphatic  drainage support, skin elasticity, and reduction in shape and size of limb.  met)  4. Patient will perform self lymph drainage techniques to areas within reach to enhance lymphatic drainage and skin condition.  (progressing,  5. Patient will tolerate daily activities with multilayered bandaging to allow for lymphatic and venous support.  met)     Long Term Goals: (12  weeks)  1. Patient will show decreased girth in B LE by up to 1 cm  to allow for LE symmetry, shoe and clothing choice, and ability to apply needed compression daily.  met)  2. Patient will show reduction in density to mild or less with improved contour of limb to allow for cosmesis, LE symmetry, infection risk reduction, and clothing and compression choice.  met)  3. Patient to ashley/doff compression garment with daily compliance to assist in lymphedema management, skin elasticity, and tissue density. met)  4. Pt to show improved postural awareness and alignment.  (progressing,  5. Pt to be I and compliant with HEP to allow for increased function in affected limb.   (progressing,ongoing     Discharge reason: Patient has reached the maximum rehab potential for the present time    Discharge FOTO Score: see media    PLAN   This patient is discharged from Physical Therapy  Essence Cobos, PT

## 2025-01-07 ENCOUNTER — TELEPHONE (OUTPATIENT)
Dept: VASCULAR SURGERY | Facility: CLINIC | Age: 65
End: 2025-01-07
Payer: COMMERCIAL

## 2025-02-27 ENCOUNTER — OFFICE VISIT (OUTPATIENT)
Dept: URGENT CARE | Facility: CLINIC | Age: 65
End: 2025-02-27
Payer: COMMERCIAL

## 2025-02-27 VITALS
HEART RATE: 78 BPM | TEMPERATURE: 98 F | WEIGHT: 176.38 LBS | SYSTOLIC BLOOD PRESSURE: 165 MMHG | DIASTOLIC BLOOD PRESSURE: 93 MMHG | RESPIRATION RATE: 19 BRPM | BODY MASS INDEX: 33.3 KG/M2 | HEIGHT: 61 IN | OXYGEN SATURATION: 96 %

## 2025-02-27 DIAGNOSIS — U07.1 COVID-19 VIRUS DETECTED: ICD-10-CM

## 2025-02-27 DIAGNOSIS — U07.1 COVID-19: Primary | ICD-10-CM

## 2025-02-27 LAB
CTP QC/QA: YES
CTP QC/QA: YES
POC MOLECULAR INFLUENZA A AGN: NEGATIVE
POC MOLECULAR INFLUENZA B AGN: NEGATIVE
SARS CORONAVIRUS 2 ANTIGEN: POSITIVE

## 2025-02-27 PROCEDURE — 99213 OFFICE O/P EST LOW 20 MIN: CPT | Mod: S$GLB,,,

## 2025-02-27 PROCEDURE — 87502 INFLUENZA DNA AMP PROBE: CPT | Mod: QW,S$GLB,,

## 2025-02-27 PROCEDURE — 87811 SARS-COV-2 COVID19 W/OPTIC: CPT | Mod: QW,S$GLB,,

## 2025-02-27 RX ORDER — PROMETHAZINE HYDROCHLORIDE AND DEXTROMETHORPHAN HYDROBROMIDE 6.25; 15 MG/5ML; MG/5ML
5 SYRUP ORAL EVERY 6 HOURS PRN
Qty: 118 ML | Refills: 0 | Status: SHIPPED | OUTPATIENT
Start: 2025-02-27

## 2025-02-27 NOTE — PROGRESS NOTES
"Subjective:      Patient ID: Khushbu Davis is a 64 y.o. female.    Vitals:  height is 5' 1" (1.549 m) and weight is 80 kg (176 lb 5.9 oz). Her oral temperature is 98.2 °F (36.8 °C). Her blood pressure is 165/93 (abnormal) and her pulse is 78. Her respiration is 19 and oxygen saturation is 96%.     Chief Complaint: Cough    64-year-old female here for sneezing, congestion over the past several days.  Yesterday, she developed coughing, sore throat, headaches.  Taking zyrtec and flonase. Denies fever, chills, chest pain, SOB, nausea, vomiting, diarrhea.    Cough  This is a new problem. The current episode started yesterday. The problem has been unchanged. The problem occurs constantly. The cough is Non-productive. Associated symptoms include ear pain, headaches, myalgias, nasal congestion and a sore throat. Pertinent negatives include no chest pain, chills, fever, rash or shortness of breath. Nothing aggravates the symptoms. Treatments tried: aleve, zyrtec. The treatment provided no relief.       Constitution: Negative for chills and fever.   HENT:  Positive for ear pain and sore throat. Negative for ear discharge.    Neck: Negative for neck pain.   Cardiovascular:  Negative for chest pain.   Respiratory:  Positive for cough. Negative for shortness of breath.    Gastrointestinal:  Negative for abdominal pain, nausea, vomiting and diarrhea.   Musculoskeletal:  Positive for muscle ache.   Skin:  Negative for rash.   Allergic/Immunologic: Positive for sneezing.   Neurological:  Positive for headaches. Negative for dizziness.      Objective:     Physical Exam   Constitutional: She is oriented to person, place, and time. She appears well-developed.   HENT:   Head: Normocephalic and atraumatic.   Ears:   Right Ear: Tympanic membrane, external ear and ear canal normal.   Left Ear: Tympanic membrane, external ear and ear canal normal.   Nose: Rhinorrhea and congestion present.   Mouth/Throat: Oropharynx is clear and moist. " Mucous membranes are moist. Oropharynx is clear.   Eyes: Conjunctivae, EOM and lids are normal. Pupils are equal, round, and reactive to light.   Neck: Trachea normal and phonation normal. Neck supple.   Cardiovascular: Normal rate, regular rhythm, normal heart sounds and normal pulses.   Pulmonary/Chest: Effort normal and breath sounds normal. No respiratory distress.   Musculoskeletal: Normal range of motion.         General: Normal range of motion.   Neurological: no focal deficit. She is alert and oriented to person, place, and time.   Skin: Skin is warm, dry and intact. Capillary refill takes less than 2 seconds.   Psychiatric: Her speech is normal and behavior is normal. Judgment and thought content normal.   Nursing note and vitals reviewed.    Results for orders placed or performed in visit on 02/27/25   SARS Coronavirus 2 Antigen, POCT Manual Read    Collection Time: 02/27/25  1:34 PM   Result Value Ref Range    SARS Coronavirus 2 Antigen Positive (A) Negative, Presumptive Negative     Acceptable Yes    POCT Influenza A/B MOLECULAR    Collection Time: 02/27/25  1:35 PM   Result Value Ref Range    POC Molecular Influenza A Ag Negative Negative    POC Molecular Influenza B Ag Negative Negative     Acceptable Yes          Assessment:     1. COVID-19        Plan:       COVID-19  -     POCT Influenza A/B MOLECULAR  -     SARS Coronavirus 2 Antigen, POCT Manual Read  -     promethazine-dextromethorphan (PROMETHAZINE-DM) 6.25-15 mg/5 mL Syrp; Take 5 mLs by mouth every 6 (six) hours as needed (cough).  Dispense: 118 mL; Refill: 0  -     nirmatrelvir-ritonavir 300 mg (150 mg x 2)-100 mg copackaged tablets (EUA); Take 3 tablets by mouth 2 (two) times daily for 5 days. Each dose contains 2 nirmatrelvir (pink tablets) and 1 ritonavir (white tablet). Take all 3 tablets together  Dispense: 30 tablet; Refill: 0        COVID risk score 2 (calculated by Epic). Pt would like paxlovid. Discussed  supportive care.              Patient Instructions   You can go back to your normal activities when, for at least 24 hours, both are true:  Your symptoms are getting better overall, and  You have not had a fever (and are not using fever-reducing medication).  When you go back to your normal activities, take added precaution over the next 5 days, such as taking additional steps for  air, hygiene, masks, physical distancing, and/or testing when you will be around other people indoors.  Keep in mind that you may still be able to spread the virus that made you sick, even if you are feeling better. You are likely to be less contagious at this time, depending on factors like how long you were sick or how sick you were.  If you develop a fever or you start to feel worse after you have gone back to normal activities, stay home and away from others again until, for at least 24 hours, both are true: your symptoms are improving overall, and you have not had a fever (and are not using fever-reducing medication). Then take added precaution for the next 5 days.    - Rest.    - Drink plenty of fluids.  - Viral upper respiratory infections typically run their course in 10-14 days.      - You can take over-the-counter claritin, zyrtec, allegra, or xyzal as directed. These are antihistamines that can help with runny nose, nasal congestion, sneezing, and helps to dry up post-nasal drip, which usually causes sore throat and cough.              - If you do NOT have high blood pressure, you may use a decongestant form (D)  of this medication (ie. Claritin- D, zyrtec-D, allegra-D) or if you do not take the D form, you can take sudafed (pseudoephedrine) over the counter, which is a decongestant. Do NOT take two decongestant (D) medications at the same time (such as mucinex-D and claritin-D or plain sudafed and claritin D)    - If you DO have Hypertension, anxiety, or palpitations, it is safe to take Coricidin HBP for relief of sinus  symptoms.     - You can use Flonase (fluticasone) nasal spray as directed for sinus congestion and postnasal drip. This is a steroid nasal spray that works locally over time to decrease the inflammation in your nose/sinuses and help with allergic symptoms. This is not an quick- relief spray like afrin, but it works well if used daily.  Discontinue if you develop nose bleed  - use nasal saline prior to Flonase.  - Use Ocean Spray Nasal Saline 1-3 puffs each nostril every 2-3 hours then blow out onto tissue. This is to irrigate the nasal passage way to clear the sinus openings. Use until sinus problem resolved.     - you can take plain Mucinex (guaifenesin) 1200 mg twice a day to help loosen mucous.      -warm salt water gargles can help with sore throat     - warm tea with honey can help with cough. Honey is a natural cough suppressant.     - Dextromethorphan (DM) is a cough suppressant over the counter (ie. mucinex DM, robitussin, delsym; dayquil/nyquil has DM as well.)        - Follow up with your PCP or specialty clinic as directed in the next 1-2 weeks if not improved or as needed.  You can call [phone] to schedule an appointment with the appropriate provider.       - Go to the ER if you develop new or worsening symptoms.      - You must understand that you have received an Urgent Care treatment only and that you may be released before all of your medical problems are known or treated.   - You, the patient, will arrange for follow up care as instructed.   - If your condition worsens or fails to improve we recommend that you receive another evaluation at the ER immediately or contact your PCP to discuss your concerns or return here.

## 2025-02-27 NOTE — PATIENT INSTRUCTIONS
You can go back to your normal activities when, for at least 24 hours, both are true:  Your symptoms are getting better overall, and  You have not had a fever (and are not using fever-reducing medication).  When you go back to your normal activities, take added precaution over the next 5 days, such as taking additional steps for  air, hygiene, masks, physical distancing, and/or testing when you will be around other people indoors.  Keep in mind that you may still be able to spread the virus that made you sick, even if you are feeling better. You are likely to be less contagious at this time, depending on factors like how long you were sick or how sick you were.  If you develop a fever or you start to feel worse after you have gone back to normal activities, stay home and away from others again until, for at least 24 hours, both are true: your symptoms are improving overall, and you have not had a fever (and are not using fever-reducing medication). Then take added precaution for the next 5 days.    - Rest.    - Drink plenty of fluids.  - Viral upper respiratory infections typically run their course in 10-14 days.      - You can take over-the-counter claritin, zyrtec, allegra, or xyzal as directed. These are antihistamines that can help with runny nose, nasal congestion, sneezing, and helps to dry up post-nasal drip, which usually causes sore throat and cough.              - If you do NOT have high blood pressure, you may use a decongestant form (D)  of this medication (ie. Claritin- D, zyrtec-D, allegra-D) or if you do not take the D form, you can take sudafed (pseudoephedrine) over the counter, which is a decongestant. Do NOT take two decongestant (D) medications at the same time (such as mucinex-D and claritin-D or plain sudafed and claritin D)    - If you DO have Hypertension, anxiety, or palpitations, it is safe to take Coricidin HBP for relief of sinus symptoms.     - You can use Flonase (fluticasone)  nasal spray as directed for sinus congestion and postnasal drip. This is a steroid nasal spray that works locally over time to decrease the inflammation in your nose/sinuses and help with allergic symptoms. This is not an quick- relief spray like afrin, but it works well if used daily.  Discontinue if you develop nose bleed  - use nasal saline prior to Flonase.  - Use Ocean Spray Nasal Saline 1-3 puffs each nostril every 2-3 hours then blow out onto tissue. This is to irrigate the nasal passage way to clear the sinus openings. Use until sinus problem resolved.     - you can take plain Mucinex (guaifenesin) 1200 mg twice a day to help loosen mucous.      -warm salt water gargles can help with sore throat     - warm tea with honey can help with cough. Honey is a natural cough suppressant.     - Dextromethorphan (DM) is a cough suppressant over the counter (ie. mucinex DM, robitussin, delsym; dayquil/nyquil has DM as well.)        - Follow up with your PCP or specialty clinic as directed in the next 1-2 weeks if not improved or as needed.  You can call [phone] to schedule an appointment with the appropriate provider.       - Go to the ER if you develop new or worsening symptoms.      - You must understand that you have received an Urgent Care treatment only and that you may be released before all of your medical problems are known or treated.   - You, the patient, will arrange for follow up care as instructed.   - If your condition worsens or fails to improve we recommend that you receive another evaluation at the ER immediately or contact your PCP to discuss your concerns or return here.

## 2025-02-27 NOTE — LETTER
February 27, 2025      [facility] [place] [address] [facility], [address]  [place] [address]  Phone: [phone]  Fax: [phone]       Patient: Khushbu Davis   YOB: 1960  Date of Visit: 02/27/2025    To Whom It May Concern:    Philippe Davis  was at [facility] on 02/27/2025. If you have any questions or concerns, or if I can be of further assistance, please do not hesitate to contact me.    Sincerely,    Michael Melara PA-C

## 2025-04-10 ENCOUNTER — TELEPHONE (OUTPATIENT)
Dept: VASCULAR SURGERY | Facility: CLINIC | Age: 65
End: 2025-04-10
Payer: COMMERCIAL

## 2025-04-10 NOTE — TELEPHONE ENCOUNTER
----- Message from Lali sent at 4/10/2025  3:01 PM CDT -----  Regarding: self  Who Called: María Left Message for Patient: MinooElosalima the patient know what this is regarding?: rescheduling appt provider not in officeWould the patient rather a call back or a response via [facility]?  Call [facility] Call Back Number: [phone] Information:Thank you.

## 2025-06-25 ENCOUNTER — TELEPHONE (OUTPATIENT)
Dept: VASCULAR SURGERY | Facility: CLINIC | Age: 65
End: 2025-06-25
Payer: COMMERCIAL

## 2025-07-15 ENCOUNTER — OFFICE VISIT (OUTPATIENT)
Dept: VASCULAR SURGERY | Facility: CLINIC | Age: 65
End: 2025-07-15
Payer: COMMERCIAL

## 2025-07-15 VITALS
SYSTOLIC BLOOD PRESSURE: 161 MMHG | BODY MASS INDEX: 34.31 KG/M2 | WEIGHT: 181.75 LBS | DIASTOLIC BLOOD PRESSURE: 99 MMHG | HEIGHT: 61 IN | HEART RATE: 93 BPM

## 2025-07-15 DIAGNOSIS — I89.0 LYMPHEDEMA OF BOTH LOWER EXTREMITIES: ICD-10-CM

## 2025-07-15 DIAGNOSIS — I89.0 LYMPHEDEMA OF BOTH UPPER EXTREMITIES: Primary | ICD-10-CM

## 2025-07-15 DIAGNOSIS — R60.9 LIPEDEMA: ICD-10-CM

## 2025-07-15 PROCEDURE — 3008F BODY MASS INDEX DOCD: CPT | Mod: CPTII,S$GLB,, | Performed by: SURGERY

## 2025-07-15 PROCEDURE — 3288F FALL RISK ASSESSMENT DOCD: CPT | Mod: CPTII,S$GLB,, | Performed by: SURGERY

## 2025-07-15 PROCEDURE — 1101F PT FALLS ASSESS-DOCD LE1/YR: CPT | Mod: CPTII,S$GLB,, | Performed by: SURGERY

## 2025-07-15 PROCEDURE — 1159F MED LIST DOCD IN RCRD: CPT | Mod: CPTII,S$GLB,, | Performed by: SURGERY

## 2025-07-15 PROCEDURE — 99214 OFFICE O/P EST MOD 30 MIN: CPT | Mod: S$GLB,,, | Performed by: SURGERY

## 2025-07-15 PROCEDURE — 3080F DIAST BP >= 90 MM HG: CPT | Mod: CPTII,S$GLB,, | Performed by: SURGERY

## 2025-07-15 PROCEDURE — 99999 PR PBB SHADOW E&M-EST. PATIENT-LVL III: CPT | Mod: PBBFAC,,, | Performed by: SURGERY

## 2025-07-15 PROCEDURE — 3077F SYST BP >= 140 MM HG: CPT | Mod: CPTII,S$GLB,, | Performed by: SURGERY

## 2025-07-15 RX ORDER — SPIRONOLACTONE 50 MG/1
50 TABLET, FILM COATED ORAL DAILY
COMMUNITY

## 2025-07-15 RX ORDER — CETIRIZINE HYDROCHLORIDE 10 MG/1
10 TABLET ORAL DAILY PRN
COMMUNITY

## 2025-07-15 NOTE — PROGRESS NOTES
Hoang Roper MD, RPVI                                 [facility]                           [facility]                             07/15/2025    HPI:  Khushbu Davis is a 65 y.o. female with   Patient Active Problem List   Diagnosis    Lymphedema of both lower extremities    Swelling of lower extremity    Lipedema    being managed by PCP and specialists who is here today for evaluation of BLE edema.  Patient states location is BLE occurring for yrs.  Associated signs and symptoms include pain.  Quality is heavy and severity is 6/10.  Symptoms began yrs ago.  Alleviating factors include elevation.  Worsening factors include dependency.  Patient has not been wearing compression stockings for greater than 3 months.  Symptoms do limit patient's functional status and daily activities.  no DVT history.  no venous interventions.  no low sodium diet.  no excessive water intake.    Migraine with aura: no  PFO/ASD/right to left shunt: no  Pregnant: no  Breastfeeding: no    no MI  no Stroke  Tobacco use: denies    2024:  c/o BLE edema despite compression and exercise > 3 mo.  +low Na diet.  +lymphedema therapy, no pumps due to finances.    2025:  +pumps and therapy.  Diff to wear compression.    Past Medical History:   Diagnosis Date    Anxiety     Depression     Hypertension      Past Surgical History:   Procedure Laterality Date    BELT ABDOMINOPLASTY       SECTION      CHOLECYSTECTOMY       No family history on file.  Social History     Socioeconomic History    Marital status:    Tobacco Use    Smoking status: Never     Social Drivers of Health     Financial Resource Strain: Low Risk  (2025)    Overall Financial Resource Strain (CARDIA)     Difficulty of Paying Living Expenses: Not hard at all   Food Insecurity: No Food Insecurity (2025)    Hunger Vital Sign     Worried About Running Out of Food in the Last Year: Never true     Ran Out of Food in the Last  Year: Never true   Transportation Needs: No Transportation Needs (7/12/2025)    PRAPARE - Transportation     Lack of Transportation (Medical): No     Lack of Transportation (Non-Medical): No   Physical Activity: Insufficiently Active (7/12/2025)    Exercise Vital Sign     Days of Exercise per Week: 5 days     Minutes of Exercise per Session: 20 min   Stress: No Stress Concern Present (7/12/2025)    [employer] [place] [employer] - Occupational Stress Questionnaire     Feeling of Stress : Not at all   Housing Stability: Low Risk  (7/12/2025)    Housing Stability Vital Sign     Unable to Pay for Housing in the Last Year: No     Number of Times Moved in the Last Year: 0     Homeless in the Last Year: No       Current Outpatient Medications:     cetirizine (ZYRTEC) 10 MG tablet, Take 10 mg by mouth daily as needed for Allergies., Disp: , Rfl:     spironolactone (ALDACTONE) 50 MG tablet, Take 50 mg by mouth once daily., Disp: , Rfl:     traZODone (DESYREL) 100 MG tablet, Take 100 mg by mouth every evening., Disp: , Rfl:     amLODIPine (NORVASC) 10 MG tablet, Take 10 mg by mouth once daily., Disp: , Rfl:     azelastine (ASTELIN) 137 mcg (0.1 %) nasal spray, 1 spray (137 mcg total) by Nasal route 2 (two) times daily. for 10 days, Disp: 30 mL, Rfl: 0    fluticasone propionate (FLONASE) 50 mcg/actuation nasal spray, 1 spray (50 mcg total) by Each Nostril route 2 (two) times daily., Disp: 9.9 mL, Rfl: 0    promethazine-dextromethorphan (PROMETHAZINE-DM) 6.25-15 mg/5 mL Syrp, Take 5 mLs by mouth every 6 (six) hours as needed (cough)., Disp: 118 mL, Rfl: 0    UNKNOWN TO PATIENT, , Disp: , Rfl:     REVIEW OF SYSTEMS:  General: No fevers or chills; ENT: No sore throat; Allergy and Immunology: no persistent infections; Hematological and Lymphatic: No history of bleeding or easy bruising; Endocrine: negative; Respiratory: no cough, shortness of breath, or wheezing; Cardiovascular: no chest pain or dyspnea on exertion;  "Gastrointestinal: no abdominal pain/back, change in bowel habits, or bloody stools; Genito-Urinary: no dysuria, trouble voiding, or hematuria; Musculoskeletal: edema; Neurological: no TIA or stroke symptoms; Psychiatric: no nervousness, anxiety or depression.    PHYSICAL EXAM:      Pulse: 93         General appearance:  Alert, well-appearing, and in no distress.  Oriented to person, place, and time                    Neurological: Normal speech, no focal findings noted; CN II - XII grossly intact. RLE with sensation to light touch, LLE with sensation to light touch.            Musculoskeletal: Digits/nail without cyanosis/clubbing.  Strength 5/5 BLE.                    Neck: Supple, no significant adenopathy                  Chest:  No wheezes, symmetric air entry. No use of accessory muscles               Cardiac: Normal rate and regular rhythm            Abdomen: Soft, nontender, nondistended      Extremities:   2+ R DP pulse, 2+ L DP pulse      1+ RLE edema, 1+ LLE edema    Skin:  RLE no ulcer; LLE no ulcer      RLE no spider veins, LLE no spider veins      RLE no varicose veins, LLE no varicose veins    CEAP 3/3    VCSS 6    LAB RESULTS:  No results found for: "CBC"  No results found for: "LABPROT", "INR"  No results found for: "NA", "K", "CL", "CO2", "GLU", "BUN", "CREATININE", "CALCIUM", "ANIONGAP", "EGFRNONAA"    No results found for: "WBC", "RBC", "HGB", "HCT", "MCV", "MCH", "MCHC", "RDW", "PLT", "MPV", "GRAN", "LYMPH", "MONO", "EOS", "BASO", "EOSINOPHIL", "BASOPHIL", "DIFFMETHOD"  .No results found for: "HGBA1C"    IMAGING:  All pertinent imaging has been reviewed and interpreted independently.    Venous US 8/2024 Impression:  Color flow evaluation of the right lower extremity demonstrates no evidence of venous thrombosis in the deep or superficial veins, and no reflux.  Color flow evaluation of the left lower extremity demonstrates no evidence of venous thrombosis in the deep or superficial veins, and no " reflux.    IMP/PLAN:  65 y.o. female with   Patient Active Problem List   Diagnosis    Lymphedema of both lower extremities    Swelling of lower extremity    Lipedema    being managed by PCP and specialists who is here today for evaluation of BLE edema, lipedema and lymphedema.    -recommend compression with Rx stockings, elevation, dietary changes associated with water and sodium intake discussed at length with patient  -Exercise   -Patient is diagnosed with lipedema and lymphedema and presents with hyperpigmentation of the lower extremity.  Patient has previously attempted compression, elevation, and exercise for at least 4 weeks.  Patient has secondary lymphedema and has tried and failed compression of 20-30 mm Hg, elevation and exercise for >1 month period however symptoms persist.  Basic pump trial performed and discontinued due to sensitivity and pain to static pressure.  Symptoms of swelling, pain and hyperplasia present.  A compression device is recommended to treat current symptoms and prevent disease progression. The patient has tried elevation, exercise x 4 weeks and compression and symptoms remain.  The patient has marked hyperkeratosis with hyperplasia and hyperpigmentation. Pt has been compliant with diet/med eval, MLD, skin care  - recommend lymphedema clinic therapy and pumps  -compression sleeve for BUE lymphedema and lipedema  -RTC prn    I spent 12 minutes evaluating this patient and greater than 50% of the time was spent counseling, coordinator care and discussing the plan of care.  All questions were answered and patient stated understanding with agreement with the above treatment plan.    Hoang Roper MD [facility]  Vascular and Endovascular Surgery

## 2025-07-22 ENCOUNTER — TELEPHONE (OUTPATIENT)
Dept: VASCULAR SURGERY | Facility: CLINIC | Age: 65
End: 2025-07-22
Payer: COMMERCIAL

## 2025-07-22 NOTE — TELEPHONE ENCOUNTER
Spoke with pt. Wants referral faxed to Dr. Escobar office. Called  [phone] confirmed fax number [phone] faxed referral to there office. Awaiting confirmation of fax via email. Pt,requested copy of referral and to come to office to .

## 2025-07-22 NOTE — TELEPHONE ENCOUNTER
Copied from CRM #[id number]. Topic: General Inquiry - Return Call  >> Jul 22, 2025  2:41 PM Barry wrote:  Type: Patient Call Back    Who called:self    What is the request in detail: Patient needs her referral for plastic surgery sent to [facility]     Can the clinic reply by [facility]?no    Would the patient rather a call back or a response via [facility]? call    Best call back number: [phone]      Additional Information: